# Patient Record
Sex: FEMALE | Race: WHITE | URBAN - METROPOLITAN AREA
[De-identification: names, ages, dates, MRNs, and addresses within clinical notes are randomized per-mention and may not be internally consistent; named-entity substitution may affect disease eponyms.]

---

## 2017-01-09 ENCOUNTER — OFFICE VISIT (OUTPATIENT)
Dept: FAMILY MEDICINE | Facility: CLINIC | Age: 42
End: 2017-01-09
Payer: COMMERCIAL

## 2017-01-09 VITALS
TEMPERATURE: 97.9 F | HEIGHT: 68 IN | BODY MASS INDEX: 42.28 KG/M2 | HEART RATE: 70 BPM | DIASTOLIC BLOOD PRESSURE: 68 MMHG | WEIGHT: 279 LBS | SYSTOLIC BLOOD PRESSURE: 124 MMHG

## 2017-01-09 DIAGNOSIS — L98.9 SKIN LESION: Primary | ICD-10-CM

## 2017-01-09 PROCEDURE — 11100 HC BIOPSY SKIN/SUBQ/MUC MEM, SINGLE LESION: CPT | Performed by: PHYSICIAN ASSISTANT

## 2017-01-09 PROCEDURE — 88305 TISSUE EXAM BY PATHOLOGIST: CPT | Performed by: PHYSICIAN ASSISTANT

## 2017-01-09 PROCEDURE — 99202 OFFICE O/P NEW SF 15 MIN: CPT | Mod: 25 | Performed by: PHYSICIAN ASSISTANT

## 2017-01-09 NOTE — MR AVS SNAPSHOT
"              After Visit Summary   2017    Nighat Nicole    MRN: [id number]           Patient Information     Date Of Birth          1975        Visit Information        Provider Department      2017 9:00 AM Gladis Fields PA-C [facility] [place]         Follow-ups after your visit        Who to contact     Normal or non-critical lab and imaging results will be communicated to you by Sharingforcehart, letter or phone within 4 business days after the clinic has received the results. If you do not hear from us within 7 days, please contact the clinic through MyChart or phone. If you have a critical or abnormal lab result, we will notify you by phone as soon as possible.  Submit refill requests through Nebula or call your pharmacy and they will forward the refill request to us. Please allow 3 business days for your refill to be completed.          If you need to speak with a  for additional information , please call: [phone]             Additional Information About Your Visit        Nebula Information     Nebula lets you send messages to your doctor, view your test results, renew your prescriptions, schedule appointments and more. To sign up, go to [email or web address] . Click on \"Log in\" on the left side of the screen, which will take you to the Welcome page. Then click on \"Sign up Now\" on the right side of the page.     You will be asked to enter the access code listed below, as well as some personal information. Please follow the directions to create your username and password.     Your access code is: [email or web address]  Expires: 2017 10:10 AM     Your access code will  in 90 days. If you need help or a new code, please call your [place] clinic or [phone].        Care EveryWhere ID     This is your Care EveryWhere ID. This could be used by other organizations to access your [place] medical records  [id number]        Your Vitals Were     Pulse Temperature " "Height BMI (Body Mass Index)          70 97.9  F (36.6  C) (Tympanic) 5' 8\" (1.727 m) 42.43 kg/m2         Blood Pressure from Last 3 Encounters:   01/09/17 124/68    Weight from Last 3 Encounters:   01/09/17 279 lb (126.554 kg)              Today, you had the following     No orders found for display       Primary Care Provider    None Specified       No primary provider on file.        Thank you!     Thank you for choosing [facility]  for your care. Our goal is always to provide you with excellent care. Hearing back from our patients is one way we can continue to improve our services. Please take a few minutes to complete the written survey that you may receive in the mail after your visit with us. Thank you!             Your Updated Medication List - Protect others around you: Learn how to safely use, store and throw away your medicines at www.disposemymeds.org.      Notice  As of 1/9/2017 10:10 AM    You have not been prescribed any medications.      "

## 2017-01-09 NOTE — PROGRESS NOTES
"  SUBJECTIVE:                                                    Nighat Nicole is a 41 year old female who presents to clinic today for the following health issues:      Concern - Bump on Finger      Onset: over 1 month     Description:   Right hand, middle finger, it started out very small and now it has gotten bigger after she has been picking and scratching at it. Redness, no pain.     Intensity: moderate    Progression of Symptoms:  worsening    Accompanying Signs & Symptoms:  None       Previous history of similar problem:   None    Precipitating factors:   Worsened by: Scratching and picking at it     Alleviating factors:  Improved by: None       Therapies Tried and outcome: None    Bump first appeared on finger about 1 month ago  She admits that she started picking at it and it scabbed over and then she continued to pick at it  She finally started covering it with a bandaid and has now left the bandaid on all the time  Bump doesn't seem to be going away     Patient does do boxing as part of her work out - not sure if that is how the bump started or not      Problem list and histories reviewed & adjusted, as indicated.  Additional history: as documented    No current outpatient prescriptions on file.     Allergies   Allergen Reactions     Seasonal Allergies        ROS:  Remainder of ROS obtained and found to be negative other than that which was documented above      OBJECTIVE:                                                    /68 mmHg  Pulse 70  Temp(Src) 97.9  F (36.6  C) (Tympanic)  Ht 5' 8\" (1.727 m)  Wt 279 lb (126.554 kg)  BMI 42.43 kg/m2  Body mass index is 42.43 kg/(m^2).  GENERAL: healthy, alert and no distress  FINGER, 3rd digit, right hand:  Over MIP joint of 3rd digit there is a loosely attached lump of thickened skin with intact skin underneath. Skin around knuckle more friable given constant bandaid use.     Diagnostic Test Results:  none      ASSESSMENT/PLAN:                          "                           (L98.9) Skin lesion  (primary encounter diagnosis)  Comment: Unclear what lesion started as - possible calloused skin secondary to boxing that she picked at to the point that it became irritated. On exam today there is a chunk or nodule that is loosely attached to the skin underneath. Discussed removal of the lesion given the skin around it is otherwise healthy appearing and will likey heal better if she is not constantly needing a bandaid. Patient agreed to this. Will send removed tissue for biopsy given unclear initial diagnosis although I suspect much of what will be seen is inflammatory changes.   Plan: BIOPSY SKIN/SUBQ/MUC MEM, SINGLE LESION,         Surgical pathology exam            PROCEDURE: Removal of skin lesion - biopsy  PERFORMED BY: Gladis Fields PA-C  CONSENT: Discussed risks and benefits of procedure as well as expected outcome. Patient understood and agreed to proceed. Consent signed and will be scanned into the chart  DESCRIPTION: Area was cleaned and prepped. <1cc of 1% lidocaine without epinephrine was used to locally numb the area. Nodule was then grasped with forceps and using an 11 blade scalpel small area of attachment was severed. Nodule placed in formalin to be sent to pathology. There was some immediate bleeding which was stopped with firm pressure to the area. A small dab of drysol was then used over the area and it was again wrapped with pressure dressing. Patient advised to leave dressing in place for 24 hours after which she can change the dressing but would apply another pressure dressing for at least 1 more day after which she can dress as needed.  COMPLICATiONS: minor bleeding which resolved quickly with pressure. Otherwise tolerated well.   Follow up as needed        Gladis Fields PA-C  [facility]

## 2017-01-09 NOTE — NURSING NOTE
"Chief Complaint   Patient presents with     Finger       Initial /68 mmHg  Pulse 70  Temp(Src) 97.9  F (36.6  C) (Tympanic)  Ht 5' 8\" (1.727 m)  Wt 279 lb (126.554 kg)  BMI 42.43 kg/m2 Estimated body mass index is 42.43 kg/(m^2) as calculated from the following:    Height as of this encounter: 5' 8\" (1.727 m).    Weight as of this encounter: 279 lb (126.554 kg).  BP completed using cuff size: aaliyah Jackson CMA    "

## 2017-01-11 LAB — COPATH REPORT: NORMAL

## 2017-04-03 ENCOUNTER — OFFICE VISIT (OUTPATIENT)
Dept: FAMILY MEDICINE | Facility: CLINIC | Age: 42
End: 2017-04-03
Payer: COMMERCIAL

## 2017-04-03 VITALS
WEIGHT: 279.8 LBS | HEART RATE: 80 BPM | DIASTOLIC BLOOD PRESSURE: 94 MMHG | BODY MASS INDEX: 42.41 KG/M2 | HEIGHT: 68 IN | SYSTOLIC BLOOD PRESSURE: 139 MMHG | TEMPERATURE: 98.1 F | OXYGEN SATURATION: 100 %

## 2017-04-03 DIAGNOSIS — R07.0 THROAT PAIN: ICD-10-CM

## 2017-04-03 DIAGNOSIS — J98.01 ACUTE BRONCHOSPASM: Primary | ICD-10-CM

## 2017-04-03 LAB
DEPRECATED S PYO AG THROAT QL EIA: NORMAL
MICRO REPORT STATUS: NORMAL
SPECIMEN SOURCE: NORMAL

## 2017-04-03 PROCEDURE — 87880 STREP A ASSAY W/OPTIC: CPT | Performed by: PHYSICIAN ASSISTANT

## 2017-04-03 PROCEDURE — 99213 OFFICE O/P EST LOW 20 MIN: CPT | Performed by: PHYSICIAN ASSISTANT

## 2017-04-03 PROCEDURE — 87081 CULTURE SCREEN ONLY: CPT | Performed by: PHYSICIAN ASSISTANT

## 2017-04-03 RX ORDER — PREDNISONE 20 MG/1
40 TABLET ORAL DAILY
Qty: 10 TABLET | Refills: 0 | Status: SHIPPED | OUTPATIENT
Start: 2017-04-03 | End: 2017-04-08

## 2017-04-03 RX ORDER — ALBUTEROL SULFATE 90 UG/1
2 AEROSOL, METERED RESPIRATORY (INHALATION) EVERY 6 HOURS PRN
Qty: 1 INHALER | Refills: 0 | Status: SHIPPED | OUTPATIENT
Start: 2017-04-03

## 2017-04-03 NOTE — PATIENT INSTRUCTIONS
Take the prednisone 40mg (2 tabs) once daily (in the morning) for the next 5 days    Use the inhaler at least two times daily - can use up to every 4 hours if it helps    Let me know if symptoms worsen or fail to improve

## 2017-04-03 NOTE — LETTER
[facility]  [address] [address]  Phone: [phone]    April 6, 2017    Nighat Nicole  [address]  [place] [address]              Dear Ms. Nicole,      The results of your 24 hour throat culture was negative.  Please contact your clinic if you have any questions or concerns.            Sincerely,      [facility] Providers

## 2017-04-03 NOTE — MR AVS SNAPSHOT
"              After Visit Summary   4/3/2017    Nighat Nicole    MRN: [id number]           Patient Information     Date Of Birth          1975        Visit Information        Provider Department      4/3/2017 9:20 AM Gladis Fields PA-C [facility]        Today's Diagnoses     Throat pain    -  1    Acute bronchospasm          Care Instructions    Take the prednisone 40mg (2 tabs) once daily (in the morning) for the next 5 days    Use the inhaler at least two times daily - can use up to every 4 hours if it helps    Let me know if symptoms worsen or fail to improve        Follow-ups after your visit        Who to contact     Normal or non-critical lab and imaging results will be communicated to you by C8 MediSensorshart, letter or phone within 4 business days after the clinic has received the results. If you do not hear from us within 7 days, please contact the clinic through C8 MediSensorshart or phone. If you have a critical or abnormal lab result, we will notify you by phone as soon as possible.  Submit refill requests through Sentinel [employer] or call your pharmacy and they will forward the refill request to us. Please allow 3 business days for your refill to be completed.          If you need to speak with a  for additional information , please call: [phone]             Additional Information About Your Visit        Sentinel [employer] Information     [employer] lets you send messages to your doctor, view your test results, renew your prescriptions, schedule appointments and more. To sign up, go to [email or web address] . Click on \"Log in\" on the left side of the screen, which will take you to the Welcome page. Then click on \"Sign up Now\" on the right side of the page.     You will be asked to enter the access code listed below, as well as some personal information. Please follow the directions to create your username and password.     Your access code is: [email or web address]  Expires: 4/9/2017 11:10 AM     Your " "access code will  in 90 days. If you need help or a new code, please call your [place] clinic or [phone].        Care EveryWhere ID     This is your Care EveryWhere ID. This could be used by other organizations to access your [place] medical records  [id number]        Your Vitals Were     Pulse Temperature Height Pulse Oximetry BMI (Body Mass Index)       80 98.1  F (36.7  C) (Tympanic) 5' 8\" (1.727 m) 100% 42.54 kg/m2        Blood Pressure from Last 3 Encounters:   17 (!) 139/94   17 124/68    Weight from Last 3 Encounters:   17 279 lb 12.8 oz (126.9 kg)   17 279 lb (126.6 kg)              We Performed the Following     Beta strep group A culture     Strep, Rapid Screen          Today's Medication Changes          These changes are accurate as of: 4/3/17 10:11 AM.  If you have any questions, ask your nurse or doctor.               Start taking these medicines.        Dose/Directions    albuterol 108 (90 BASE) MCG/ACT Inhaler   Commonly known as:  PROAIR HFA/PROVENTIL HFA/VENTOLIN HFA   Used for:  Acute bronchospasm   Started by:  Gladis Fields PA-C        Dose:  2 puff   Inhale 2 puffs into the lungs every 6 hours as needed for shortness of breath / dyspnea or wheezing   Quantity:  1 Inhaler   Refills:  0       predniSONE 20 MG tablet   Commonly known as:  DELTASONE   Used for:  Acute bronchospasm   Started by:  Gladis Fields PA-C        Dose:  40 mg   Take 2 tablets (40 mg) by mouth daily for 5 days   Quantity:  10 tablet   Refills:  0            Where to get your medicines      These medications were sent to [facility] [place], [place] - [address]  [address] [place] [address]     Phone:  [phone]     albuterol 108 (90 BASE) MCG/ACT Inhaler    predniSONE 20 MG tablet                Primary Care Provider    None Specified       No primary provider on file.        Thank you!     Thank you for choosing [facility]  for your care. Our goal is always to provide you with excellent care. Hearing back from our patients is one way we can continue to improve our services. Please take a few minutes to complete the written survey that you may receive in the mail after your visit with us. Thank you!             Your Updated Medication List - Protect others around you: Learn how to safely use, store and throw away your medicines at www.disposemymeds.org.          This list is accurate as of: 4/3/17 10:11 AM.  Always use your most recent med list.                   Brand Name Dispense Instructions for use    albuterol 108 (90 BASE) MCG/ACT Inhaler    PROAIR HFA/PROVENTIL HFA/VENTOLIN HFA    1 Inhaler    Inhale 2 puffs into the lungs every 6 hours as needed for shortness of breath / dyspnea or wheezing       predniSONE 20 MG tablet    DELTASONE    10 tablet    Take 2 tablets (40 mg) by mouth daily for 5 days

## 2017-04-03 NOTE — LETTER
[facility]  [address] [facility] [address] [facility] [address] [address]  Phone: [phone]    April 3, 2017        Nighat Nicole  [address]  [place] [address]          To whom it may concern:    RE: Nighat Nicole    Patient was seen and treated today at our clinic and missed work.     Please contact me for questions or concerns.      Sincerely,        Gladis Fields PA-C

## 2017-04-03 NOTE — PROGRESS NOTES
SUBJECTIVE:                                                    Nighat Nicole is a 41 year old female who presents to clinic today for the following health issues:      ENT Symptoms             Symptoms: cc Present Absent Comment   Fever/Chills  x  Maybe when is started last week   Fatigue  x     Muscle Aches  x  Last week   Eye Irritation  x     Sneezing  x  Over the weekend   Nasal Hood/Drg x x     Sinus Pressure/Pain  x     Loss of smell   x    Dental pain   x    Sore Throat  x     Swollen Glands  x     Ear Pain/Fullness   x    Cough x x     Wheeze  x     Chest Pain  x  Pain from coughing   Shortness of breath  x  At night   Rash   x    Other  x  headache from the sinus pressure     Symptom duration:  Almost a week. Start last Tuesday evening   Symptom severity:  moderate   Treatments tried:  night time stuff with acetaminophen, and ibuprofen. Last dose of ibuprofen ws yesterday afternoon   Contacts:  was sick       Started with body aches and chills  Now it's the cough, feeling tight in he her chest  No h/o asthma - did have an inhaler when she was a kid        Problem list and histories reviewed & adjusted, as indicated.  Additional history: as documented    Current Outpatient Prescriptions   Medication Sig Dispense Refill     predniSONE (DELTASONE) 20 MG tablet Take 2 tablets (40 mg) by mouth daily for 5 days 10 tablet 0     albuterol (PROAIR HFA/PROVENTIL HFA/VENTOLIN HFA) 108 (90 BASE) MCG/ACT Inhaler Inhale 2 puffs into the lungs every 6 hours as needed for shortness of breath / dyspnea or wheezing 1 Inhaler 0     Allergies   Allergen Reactions     Seasonal Allergies        Reviewed and updated as needed this visit by clinical staff       Reviewed and updated as needed this visit by Provider         ROS:  Remainder of ROS obtained and found to be negative other than that which was documented above      OBJECTIVE:                                                    BP (!) 139/94 (BP Location: Right  "arm, Patient Position: Chair, Cuff Size: Adult Large)  Pulse 80  Temp 98.1  F (36.7  C) (Tympanic)  Ht 5' 8\" (1.727 m)  Wt 279 lb 12.8 oz (126.9 kg)  SpO2 100%  BMI 42.54 kg/m2  Body mass index is 42.54 kg/(m^2).  GENERAL: healthy, alert and no distress  EYES: Eyes grossly normal to inspection  HENT: ear canals and TM's normal, nose and mouth without ulcers or lesions  NECK: no adenopathy and no asymmetry, masses, or scars  RESP: lungs with breath sounds throughout, no crackles but prolonged expiratory phase with wheezing  CV: regular rates and rhythm, normal S1 S2, no S3 or S4, no murmur, click or rub and no peripheral edema    Diagnostic Test Results:  Rapid strep: negative     ASSESSMENT/PLAN:                                                    (J98.01) Acute bronchospasm  (primary encounter diagnosis)  Comment: rapid negative. Lung exam with wheezing throughout, no crackles. Will treat as bronchitis with bronchospasm. Will follow up with throat culture and advised patient to call or follow up if any of the symptoms worsen.   Reviewed side effects of prednisone - to call if any difficulty taking the medication  Plan: predniSONE (DELTASONE) 20 MG tablet, albuterol         (PROAIR HFA/PROVENTIL HFA/VENTOLIN HFA) 108 (90        BASE) MCG/ACT Inhaler            (R07.0) Throat pain  Comment:   Plan: Strep, Rapid Screen, Beta strep group A culture                    Gladis Fields PA-C  [facility]    "

## 2017-04-03 NOTE — NURSING NOTE
"Chief Complaint   Patient presents with     URI       Initial There were no vitals taken for this visit. Estimated body mass index is 42.42 kg/(m^2) as calculated from the following:    Height as of 1/9/17: 5' 8\" (1.727 m).    Weight as of 1/9/17: 279 lb (126.6 kg).  Medication Reconciliation: complete   Claudia Pepper CMA    "

## 2017-04-04 ENCOUNTER — TELEPHONE (OUTPATIENT)
Dept: FAMILY MEDICINE | Facility: CLINIC | Age: 42
End: 2017-04-04

## 2017-04-04 NOTE — TELEPHONE ENCOUNTER
Reason for Call:  Other call back    Detailed comments: She was seen yesterday for the flu.  She stayed home from work again today.  She is wondering if she can get a note for work for today.  She will pick the note up at the .  Please advise.    Phone Number Patient can be reached at: Home number on file [phone] (home)    Best Time: any    Can we leave a detailed message on this number? YES    Call taken on 4/4/2017 at 9:17 AM by Scarlett Thomas

## 2017-04-05 LAB
BACTERIA SPEC CULT: NORMAL
MICRO REPORT STATUS: NORMAL
SPECIMEN SOURCE: NORMAL

## 2017-04-06 NOTE — TELEPHONE ENCOUNTER
I was not in the office when she called initially.   Would she still like the letter for 4/4 or did she miss any additional days?   Is there a fax number we can send it to or would she rather pick it up?   Gladis

## 2017-04-07 NOTE — TELEPHONE ENCOUNTER
Patient was notified of strep culture results.    Patient does not need a note at this time.      Lenore MARLEY RN

## 2017-05-01 ENCOUNTER — OFFICE VISIT (OUTPATIENT)
Dept: FAMILY MEDICINE | Facility: CLINIC | Age: 42
End: 2017-05-01
Payer: COMMERCIAL

## 2017-05-01 VITALS
WEIGHT: 277 LBS | HEIGHT: 68 IN | TEMPERATURE: 98 F | SYSTOLIC BLOOD PRESSURE: 132 MMHG | BODY MASS INDEX: 41.98 KG/M2 | DIASTOLIC BLOOD PRESSURE: 88 MMHG | HEART RATE: 83 BPM

## 2017-05-01 DIAGNOSIS — H10.33 ACUTE BACTERIAL CONJUNCTIVITIS OF BOTH EYES: Primary | ICD-10-CM

## 2017-05-01 DIAGNOSIS — J06.9 UPPER RESPIRATORY TRACT INFECTION, UNSPECIFIED TYPE: ICD-10-CM

## 2017-05-01 PROCEDURE — 99213 OFFICE O/P EST LOW 20 MIN: CPT | Performed by: PHYSICIAN ASSISTANT

## 2017-05-01 NOTE — MR AVS SNAPSHOT
"              After Visit Summary   2017    Nighat Nicole    MRN: [id number]           Patient Information     Date Of Birth          1975        Visit Information        Provider Department      2017 10:00 AM Gladis Fields PA-C [facility] [place]         Follow-ups after your visit        Who to contact     Normal or non-critical lab and imaging results will be communicated to you by MyChart, letter or phone within 4 business days after the clinic has received the results. If you do not hear from us within 7 days, please contact the clinic through MyChart or phone. If you have a critical or abnormal lab result, we will notify you by phone as soon as possible.  Submit refill requests through [employer] Noodle or call your pharmacy and they will forward the refill request to us. Please allow 3 business days for your refill to be completed.          If you need to speak with a  for additional information , please call: [phone]             Additional Information About Your Visit        Insurance Noodle Information     Ins[employer] Noodle lets you send messages to your doctor, view your test results, renew your prescriptions, schedule appointments and more. To sign up, go to [email or web address] . Click on \"Log in\" on the left side of the screen, which will take you to the Welcome page. Then click on \"Sign up Now\" on the right side of the page.     You will be asked to enter the access code listed below, as well as some personal information. Please follow the directions to create your username and password.     Your access code is: [email or web address]  Expires: 2017 10:31 AM     Your access code will  in 90 days. If you need help or a new code, please call your [place] clinic or [phone].        Care EveryWhere ID     This is your Care EveryWhere ID. This could be used by other organizations to access your [place] medical records  [id number]        Your Vitals Were     Pulse " "Temperature Height BMI (Body Mass Index)          83 98  F (36.7  C) (Tympanic) 5' 8\" (1.727 m) 42.12 kg/m2         Blood Pressure from Last 3 Encounters:   05/01/17 132/88   04/03/17 (!) 139/94   01/09/17 124/68    Weight from Last 3 Encounters:   05/01/17 277 lb (125.6 kg)   04/03/17 279 lb 12.8 oz (126.9 kg)   01/09/17 279 lb (126.6 kg)              Today, you had the following     No orders found for display       Primary Care Provider    None Specified       No primary provider on file.        Thank you!     Thank you for choosing [facility]  for your care. Our goal is always to provide you with excellent care. Hearing back from our patients is one way we can continue to improve our services. Please take a few minutes to complete the written survey that you may receive in the mail after your visit with us. Thank you!             Your Updated Medication List - Protect others around you: Learn how to safely use, store and throw away your medicines at www.disposemymeds.org.          This list is accurate as of: 5/1/17 10:31 AM.  Always use your most recent med list.                   Brand Name Dispense Instructions for use    albuterol 108 (90 BASE) MCG/ACT Inhaler    PROAIR HFA/PROVENTIL HFA/VENTOLIN HFA    1 Inhaler    Inhale 2 puffs into the lungs every 6 hours as needed for shortness of breath / dyspnea or wheezing         "

## 2017-05-01 NOTE — PROGRESS NOTES
SUBJECTIVE:                                                    Nighat Nicole is a 41 year old female who presents to clinic today for the following health issues:      Eye(s) Problem     Onset: Friday 4/28     Description:   Location: bilateral, started with the right eye   Pain: YES- aching and sore   Redness: YES    Accompanying Signs & Symptoms:  Discharge/mattering: YES  Swelling: YES  Visual changes: no  Fever: no  Nasal Congestion: YES  Bothered by bright lights: no     History:   Trauma: no   Foreign body exposure: no    Precipitating factors:   Wearing contacts: no    Alleviating factors:  Improved by: None       Therapies Tried and outcome: Tried eye drops she got from urgent care, ibuprofen, decongestant       Was seen in [facility], on Friday 4/28. Was given eye drops.     Cold symptoms for a few weeks  Does feel like they are/were better until Friday  Noticed some more sinus pressure and her eyes started to look red  Seemed to get worse during the day so went to urgent care - they weren't convinced it was pink eye but gave her drops and told her to use them if the eyes worsened.   Says by that night she had more discharge and redness so started the drops  Continues to have drainage and redness. No pain with eye movements vision intact  No cough - overall better  Still some congestion but not sure if she just isn't feeling better because of her eyes     recently treated for sinus infection and pink eye      Problem list and histories reviewed & adjusted, as indicated.  Additional history: as documented    Current Outpatient Prescriptions   Medication Sig Dispense Refill     albuterol (PROAIR HFA/PROVENTIL HFA/VENTOLIN HFA) 108 (90 BASE) MCG/ACT Inhaler Inhale 2 puffs into the lungs every 6 hours as needed for shortness of breath / dyspnea or wheezing (Patient not taking: Reported on 5/1/2017) 1 Inhaler 0     Allergies   Allergen Reactions     Seasonal Allergies   "      Reviewed and updated as needed this visit by clinical staff       Reviewed and updated as needed this visit by Provider         ROS:  Remainder of ROS obtained and found to be negative other than that which was documented above      OBJECTIVE:                                                    /88  Pulse 83  Temp 98  F (36.7  C) (Tympanic)  Ht 5' 8\" (1.727 m)  Wt 277 lb (125.6 kg)  BMI 42.12 kg/m2  Body mass index is 42.12 kg/(m^2).  GENERAL: healthy, alert and no distress  EYES: conjunctival inflammation, redness. PERRL. EOMI without pain  HENT: ear canals and TM's normal, nose and mouth without ulcers or lesions  RESP: lungs clear to auscultation - no rales, rhonchi or wheezes  CV: regular rates and rhythm, normal S1 S2, no S3 or S4 and no murmur, click or rub    Diagnostic Test Results:  none      ASSESSMENT/PLAN:                                                    (H10.33) Acute bacterial conjunctivitis of both eyes  (primary encounter diagnosis)  Comment: on polytrim x2 days of therapy  Plan: Continue with the polytrim. Follow up if not improving    (J06.9) Upper respiratory tract infection, unspecified type  Comment: overall symptoms have improved although still present slightly  Plan: continue eye treatment as above and symptomatic management. Follow up in the next 2-3 days if no change or any worsening.         Gladis Fields PA-C  [facility]    "

## 2017-05-01 NOTE — NURSING NOTE
"Chief Complaint   Patient presents with     Eye Problem       Initial BP (!) 140/98 (BP Location: Right arm, Patient Position: Chair, Cuff Size: Adult Large)  Pulse 83  Temp 98  F (36.7  C) (Tympanic)  Ht 5' 8\" (1.727 m)  Wt 277 lb (125.6 kg)  BMI 42.12 kg/m2 Estimated body mass index is 42.12 kg/(m^2) as calculated from the following:    Height as of this encounter: 5' 8\" (1.727 m).    Weight as of this encounter: 277 lb (125.6 kg).  Medication Reconciliation: complete     Jong Jackson CMA    "

## 2017-05-01 NOTE — LETTER
[facility]  [id number] [place] [id number]  Phone: [phone]    May 1, 2017        Nighat Nicole  [address]  [facility] [address]          To whom it may concern:    RE: Nighat Nicole    Patient was seen and treated today at our clinic and missed work 5/1/17 and 5/2/17    Please contact me for questions or concerns.      Sincerely,        Gladis Fields PA-C

## 2017-05-03 ENCOUNTER — TELEPHONE (OUTPATIENT)
Dept: FAMILY MEDICINE | Facility: CLINIC | Age: 42
End: 2017-05-03

## 2017-05-03 DIAGNOSIS — J01.90 ACUTE SINUSITIS WITH SYMPTOMS > 10 DAYS: Primary | ICD-10-CM

## 2017-05-03 NOTE — TELEPHONE ENCOUNTER
Reason for call:  Patient reporting a symptom    Symptom or request: pink eye    Duration (how long have symptoms been present): since 5/1/17    Have you been treated for this before? No    Additional comments: Nighat MIRANDA MESSAGE:  States she was seen by Gladis on 5/1/17 and asked to call back by middle of week if her eye was not better.  It's still red and she also has a sore throat.  Please call and assess.  Thank you..Massiel Lancaster    Phone Number patient can be reached at:  Home number on file [phone] (home)    Best Time:  Did not state in message    Can we leave a detailed message on this number:  did not state in letter    Call taken on 5/3/2017 at 9:01 AM by Massiel Lancaster

## 2017-05-03 NOTE — TELEPHONE ENCOUNTER
Message left on answering machine to call the [facility] RN back.  Hermelindo Montoya RN

## 2017-05-04 NOTE — TELEPHONE ENCOUNTER
Please call patient and let her know I sent in a script for an oral antibiotic to the [place] pharmacy  She has had upper respiratory symptoms consistent with a possible sinus infection now for over 2 weeks. Given lack of improvement with over the counter treatments, will try an oral antibiotic.   She can continue the eye drops although it is very likely the eye is more viral in origin in which case there are no drops that are going to make it go away any quicker and it just needs time.   Hopefully all symptoms improve with the oral medications  She is able to return to work     Gladis

## 2017-05-05 ENCOUNTER — TELEPHONE (OUTPATIENT)
Dept: FAMILY MEDICINE | Facility: CLINIC | Age: 42
End: 2017-05-05

## 2017-05-05 NOTE — TELEPHONE ENCOUNTER
Panel Management Review      Patient has the following on her problem list: None      Composite cancer screening  Chart review shows that this patient is due/due soon for the following Pap Smear  Summary:    Patient is due/failing the following:   PAP and PHYSICAL    Action needed:   Patient needs office visit for a physical and pap.    Type of outreach:    Phone, left message for patient to call back.     Questions for provider review:    None                                                                                                                                    Jong Jackson CMA       Chart routed to self.

## 2017-06-06 ENCOUNTER — OFFICE VISIT (OUTPATIENT)
Dept: FAMILY MEDICINE | Facility: CLINIC | Age: 42
End: 2017-06-06
Payer: COMMERCIAL

## 2017-06-06 VITALS
HEART RATE: 76 BPM | TEMPERATURE: 97.9 F | DIASTOLIC BLOOD PRESSURE: 78 MMHG | SYSTOLIC BLOOD PRESSURE: 130 MMHG | WEIGHT: 278 LBS | BODY MASS INDEX: 42.13 KG/M2 | HEIGHT: 68 IN

## 2017-06-06 DIAGNOSIS — N89.8 VAGINAL DISCHARGE: ICD-10-CM

## 2017-06-06 DIAGNOSIS — R10.2 PELVIC PRESSURE IN FEMALE: Primary | ICD-10-CM

## 2017-06-06 LAB
ALBUMIN UR-MCNC: NEGATIVE MG/DL
APPEARANCE UR: CLEAR
BILIRUB UR QL STRIP: NEGATIVE
COLOR UR AUTO: YELLOW
GLUCOSE UR STRIP-MCNC: NEGATIVE MG/DL
HGB UR QL STRIP: ABNORMAL
KETONES UR STRIP-MCNC: NEGATIVE MG/DL
LEUKOCYTE ESTERASE UR QL STRIP: NEGATIVE
MICRO REPORT STATUS: NORMAL
NITRATE UR QL: NEGATIVE
PH UR STRIP: 5.5 PH (ref 5–7)
RBC #/AREA URNS AUTO: NORMAL /HPF (ref 0–2)
SP GR UR STRIP: 1.02 (ref 1–1.03)
SPECIMEN SOURCE: NORMAL
URN SPEC COLLECT METH UR: ABNORMAL
UROBILINOGEN UR STRIP-ACNC: 0.2 EU/DL (ref 0.2–1)
WBC #/AREA URNS AUTO: NORMAL /HPF (ref 0–2)
WET PREP SPEC: NORMAL

## 2017-06-06 PROCEDURE — 99213 OFFICE O/P EST LOW 20 MIN: CPT | Performed by: PHYSICIAN ASSISTANT

## 2017-06-06 PROCEDURE — 87210 SMEAR WET MOUNT SALINE/INK: CPT | Performed by: PHYSICIAN ASSISTANT

## 2017-06-06 PROCEDURE — 81001 URINALYSIS AUTO W/SCOPE: CPT | Performed by: PHYSICIAN ASSISTANT

## 2017-06-06 NOTE — MR AVS SNAPSHOT
"              After Visit Summary   2017    Nighat Nicole    MRN: [id number]           Patient Information     Date Of Birth          1975        Visit Information        Provider Department      2017 9:20 AM Gladis Fields PA-C [facility]        Today's Diagnoses     Pelvic pressure in female    -  1    Vaginal discharge           Follow-ups after your visit        Who to contact     Normal or non-critical lab and imaging results will be communicated to you by Dizko Samuraihart, letter or phone within 4 business days after the clinic has received the results. If you do not hear from us within 7 days, please contact the clinic through Dizko Samuraihart or phone. If you have a critical or abnormal lab result, we will notify you by phone as soon as possible.  Submit refill requests through Habitissimo or call your pharmacy and they will forward the refill request to us. Please allow 3 business days for your refill to be completed.          If you need to speak with a  for additional information , please call: [phone]             Additional Information About Your Visit        Habitissimo Information     Habitissimo lets you send messages to your doctor, view your test results, renew your prescriptions, schedule appointments and more. To sign up, go to [email or web address] . Click on \"Log in\" on the left side of the screen, which will take you to the Welcome page. Then click on \"Sign up Now\" on the right side of the page.     You will be asked to enter the access code listed below, as well as some personal information. Please follow the directions to create your username and password.     Your access code is: [email or web address]  Expires: 2017 10:31 AM     Your access code will  in 90 days. If you need help or a new code, please call your [facility] or [phone].        Care EveryWhere ID     This is your Care EveryWhere ID. This could be used by other organizations to access " "your [facility] medical records  [id number]        Your Vitals Were     Pulse Temperature Height BMI (Body Mass Index)          76 97.9  F (36.6  C) (Tympanic) 5' 8\" (1.727 m) 42.27 kg/m2         Blood Pressure from Last 3 Encounters:   06/06/17 130/78   05/01/17 132/88   04/03/17 (!) 139/94    Weight from Last 3 Encounters:   06/06/17 278 lb (126.1 kg)   05/01/17 277 lb (125.6 kg)   04/03/17 279 lb 12.8 oz (126.9 kg)              We Performed the Following     *UA reflex to Microscopic and Culture ([place] and [facility] (except [place] and [place])     Urine Microscopic     Wet prep        Primary Care Provider    None Specified       No primary provider on file.        Thank you!     Thank you for choosing [facility]  for your care. Our goal is always to provide you with excellent care. Hearing back from our patients is one way we can continue to improve our services. Please take a few minutes to complete the written survey that you may receive in the mail after your visit with us. Thank you!             Your Updated Medication List - Protect others around you: Learn how to safely use, store and throw away your medicines at www.disposemymeds.org.          This list is accurate as of: 6/6/17 10:15 AM.  Always use your most recent med list.                   Brand Name Dispense Instructions for use    albuterol 108 (90 BASE) MCG/ACT Inhaler    PROAIR HFA/PROVENTIL HFA/VENTOLIN HFA    1 Inhaler    Inhale 2 puffs into the lungs every 6 hours as needed for shortness of breath / dyspnea or wheezing         "

## 2017-06-06 NOTE — PROGRESS NOTES
SUBJECTIVE:                                                    Nighat Nicole is a 41 year old female who presents to clinic today for the following health issues:      URINARY TRACT SYMPTOMS     Onset: Started over the weekend     Description:   Painful urination (Dysuria): YES, pain and pressure   Blood in urine (Hematuria): YES- when she wipes there in pink on the toilet paper   Delay in urine (Hesitency): YES- frequency also     Intensity: moderate    Progression of Symptoms:  worsening    Accompanying Signs & Symptoms:  Fever/chills: no   Flank pain YES- she noticed lower back pain on Friday night, not as much anymore   Nausea and vomiting: no   Any vaginal symptoms: vaginal itching  Abdominal/Pelvic Pain: YES- when using the bathroom    History:   History of frequent UTI's: no   History of kidney stones: YES- she had kidney stones 11 years ago   Sexually Active: YES  Possibility of pregnancy: No    Precipitating factors:   None         Therapies Tried and outcome: Cranberry pills, drinking lemon water, Ibuprofen, Tylenol     Started on Friday  Symptoms still noticeable with urination - lots of pressure with urinating. Some burning and discomfort  Back pain improved  No fevers  Some itching in the area  Some increased discharge    End of last period was Friday  Regular per patient        Problem list and histories reviewed & adjusted, as indicated.  Additional history: as documented    Current Outpatient Prescriptions   Medication Sig Dispense Refill     albuterol (PROAIR HFA/PROVENTIL HFA/VENTOLIN HFA) 108 (90 BASE) MCG/ACT Inhaler Inhale 2 puffs into the lungs every 6 hours as needed for shortness of breath / dyspnea or wheezing (Patient not taking: Reported on 5/1/2017) 1 Inhaler 0     Allergies   Allergen Reactions     Seasonal Allergies        Reviewed and updated as needed this visit by clinical staff       Reviewed and updated as needed this visit by Provider         ROS:  Remainder of ROS obtained and  "found to be negative other than that which was documented above      OBJECTIVE:                                                    /78 (BP Location: Right arm, Patient Position: Chair, Cuff Size: Adult Large)  Pulse 76  Temp 97.9  F (36.6  C) (Tympanic)  Ht 5' 8\" (1.727 m)  Wt 278 lb (126.1 kg)  BMI 42.27 kg/m2  Body mass index is 42.27 kg/(m^2).  GENERAL: healthy, alert and no distress  RESP: lungs clear to auscultation - no rales, rhonchi or wheezes  CV: regular rates and rhythm, normal S1 S2, no S3 or S4 and no murmur, click or rub  ABDOMEN: soft, nontender and bowel sounds normal    Diagnostic Test Results:  Urinalysis -   UA RESULTS:  Recent Labs   Lab Test  06/06/17   0929   COLOR  Yellow   APPEARANCE  Clear   URINEGLC  Negative   URINEBILI  Negative   URINEKETONE  Negative   SG  1.025   UBLD  Moderate*   URINEPH  5.5   PROTEIN  Negative   UROBILINOGEN  0.2   NITRITE  Negative   LEUKEST  Negative   RBCU  O - 2  Urine was tested unconcentrated because <10 ml was received.     WBCU  O - 2  Urine was tested unconcentrated because <10 ml was received.            Wet prep: negative    ASSESSMENT/PLAN:                                                        ICD-10-CM    1. Pelvic pressure in female N94.89 *UA reflex to Microscopic and Culture ([place] and [facility] (except [place] and [place])     Urine Microscopic     Wet prep   2. Vaginal discharge N89.8 *UA reflex to Microscopic and Culture ([place] and [facility] (except [place] and [place])     Urine Microscopic       UA and wet prep negative.   Patient in no acute distress and symptoms slightly improved  Work on drinking plenty of fluids today to \"flush\" out system  If anything worsens, return right away  If no worse but still with lingering symptoms on Thursday, return for lab visit for repeat UA and self wet prep    Gladis Fields PA-C  [facility] [place]    "

## 2017-06-06 NOTE — NURSING NOTE
"Chief Complaint   Patient presents with     Kidney Problem       Initial /78 (BP Location: Right arm, Patient Position: Chair, Cuff Size: Adult Large)  Pulse 76  Temp 97.9  F (36.6  C) (Tympanic)  Ht 5' 8\" (1.727 m)  Wt 278 lb (126.1 kg)  BMI 42.27 kg/m2 Estimated body mass index is 42.27 kg/(m^2) as calculated from the following:    Height as of this encounter: 5' 8\" (1.727 m).    Weight as of this encounter: 278 lb (126.1 kg).  Medication Reconciliation: complete     Jong Jackson CMA    "

## 2017-06-08 ENCOUNTER — OFFICE VISIT (OUTPATIENT)
Dept: FAMILY MEDICINE | Facility: CLINIC | Age: 42
End: 2017-06-08
Payer: COMMERCIAL

## 2017-06-08 VITALS
TEMPERATURE: 98.2 F | WEIGHT: 280 LBS | HEIGHT: 68 IN | BODY MASS INDEX: 42.44 KG/M2 | SYSTOLIC BLOOD PRESSURE: 128 MMHG | DIASTOLIC BLOOD PRESSURE: 72 MMHG | HEART RATE: 74 BPM

## 2017-06-08 DIAGNOSIS — R10.2 PELVIC PRESSURE IN FEMALE: ICD-10-CM

## 2017-06-08 DIAGNOSIS — R30.0 BURNING WITH URINATION: ICD-10-CM

## 2017-06-08 DIAGNOSIS — B37.31 CANDIDIASIS OF VAGINA: ICD-10-CM

## 2017-06-08 DIAGNOSIS — Z12.4 ENCOUNTER FOR SCREENING FOR CERVICAL CANCER: Primary | ICD-10-CM

## 2017-06-08 LAB
ALBUMIN UR-MCNC: NEGATIVE MG/DL
APPEARANCE UR: CLEAR
BILIRUB UR QL STRIP: NEGATIVE
COLOR UR AUTO: YELLOW
GLUCOSE UR STRIP-MCNC: NEGATIVE MG/DL
HGB UR QL STRIP: NEGATIVE
KETONES UR STRIP-MCNC: NEGATIVE MG/DL
LEUKOCYTE ESTERASE UR QL STRIP: NEGATIVE
MICRO REPORT STATUS: ABNORMAL
NITRATE UR QL: NEGATIVE
PH UR STRIP: 5 PH (ref 5–7)
SP GR UR STRIP: 1.02 (ref 1–1.03)
SPECIMEN SOURCE: ABNORMAL
URN SPEC COLLECT METH UR: NORMAL
UROBILINOGEN UR STRIP-ACNC: 0.2 EU/DL (ref 0.2–1)
WET PREP SPEC: ABNORMAL

## 2017-06-08 PROCEDURE — 87624 HPV HI-RISK TYP POOLED RSLT: CPT | Performed by: PHYSICIAN ASSISTANT

## 2017-06-08 PROCEDURE — 81003 URINALYSIS AUTO W/O SCOPE: CPT | Performed by: PHYSICIAN ASSISTANT

## 2017-06-08 PROCEDURE — 99213 OFFICE O/P EST LOW 20 MIN: CPT | Performed by: PHYSICIAN ASSISTANT

## 2017-06-08 PROCEDURE — 87210 SMEAR WET MOUNT SALINE/INK: CPT | Performed by: PHYSICIAN ASSISTANT

## 2017-06-08 PROCEDURE — G0145 SCR C/V CYTO,THINLAYER,RESCR: HCPCS | Performed by: PHYSICIAN ASSISTANT

## 2017-06-08 RX ORDER — FLUCONAZOLE 150 MG/1
150 TABLET ORAL ONCE
Qty: 1 TABLET | Refills: 0 | Status: SHIPPED | OUTPATIENT
Start: 2017-06-08 | End: 2017-06-08

## 2017-06-08 NOTE — LETTER
June 16, 2017    Nighat Nicole  [address]  [place] [address]    Dear Nighat,  We are happy to inform you that your PAP smear result from 6/8/17 is normal.  We are now able to do a follow up test on PAP smears. The DNA test is for HPV (Human Papilloma Virus). Cervical cancer is closely linked with certain types of HPV. Your result showed no evidence of high risk HPV.  Therefore we recommend you return in 3 years for your next pap smear and HPV test.  You will still need to return to the clinic every year for an annual exam and other preventive tests.  Please contact the clinic at [phone] with any questions.  Sincerely,    Gladis Fields PA-C/iza

## 2017-06-08 NOTE — PATIENT INSTRUCTIONS
Wet prep did show yeast - this could be causing the burning and irritation    Take the one dose of diflucan    Skin was a little red and irritated - the diflucan may help with this but you could also try vagisil or coconut oil to the outer skin surface as this can be soothing to irritated skin    Let me know if you continue to experience any symptoms

## 2017-06-08 NOTE — MR AVS SNAPSHOT
"              After Visit Summary   6/8/2017    Nighat Nicole    MRN: [id number]           Patient Information     Date Of Birth          1975        Visit Information        Provider Department      6/8/2017 9:20 AM Gladis Fields PA-C [facility]        Today's Diagnoses     Encounter for screening for cervical cancer     -  1    Burning with urination        Pelvic pressure in female        Candidiasis of vagina          Care Instructions      Wet prep did show yeast - this could be causing the burning and irritation    Take the one dose of diflucan    Skin was a little red and irritated - the diflucan may help with this but you could also try vagisil or coconut oil to the outer skin surface as this can be soothing to irritated skin    Let me know if you continue to experience any symptoms          Follow-ups after your visit        Who to contact     Normal or non-critical lab and imaging results will be communicated to you by fuseSPORThart, letter or phone within 4 business days after the clinic has received the results. If you do not hear from us within 7 days, please contact the clinic through fuseSPORThart or phone. If you have a critical or abnormal lab result, we will notify you by phone as soon as possible.  Submit refill requests through iCrossing or call your pharmacy and they will forward the refill request to us. Please allow 3 business days for your refill to be completed.          If you need to speak with a  for additional information , please call: [phone]             Additional Information About Your Visit        iCrossing Information     iCrossing lets you send messages to your doctor, view your test results, renew your prescriptions, schedule appointments and more. To sign up, go to [email or web address] . Click on \"Log in\" on the left side of the screen, which will take you to the Welcome page. Then click on \"Sign up Now\" on the right side of the page. " "    You will be asked to enter the access code listed below, as well as some personal information. Please follow the directions to create your username and password.     Your access code is: [email or web address]  Expires: 2017 10:31 AM     Your access code will  in 90 days. If you need help or a new code, please call your [facility] clinic or [phone].        Care EveryWhere ID     This is your Care EveryWhere ID. This could be used by other organizations to access your [facility] medical records  [id number]        Your Vitals Were     Pulse Temperature Height BMI (Body Mass Index)          74 98.2  F (36.8  C) (Tympanic) 5' 8\" (1.727 m) 42.57 kg/m2         Blood Pressure from Last 3 Encounters:   17 128/72   17 130/78   17 132/88    Weight from Last 3 Encounters:   17 280 lb (127 kg)   17 278 lb (126.1 kg)   17 277 lb (125.6 kg)              We Performed the Following     *UA reflex to Microscopic and Culture ([place] and [facility] (except [place] and [place])     HPV High Risk Types DNA Cervical     Pap imaged thin layer screen with HPV - recommended age 30 - 65 years (select HPV order below)     Wet prep          Today's Medication Changes          These changes are accurate as of: 17 10:01 AM.  If you have any questions, ask your nurse or doctor.               Start taking these medicines.        Dose/Directions    fluconazole 150 MG tablet   Commonly known as:  DIFLUCAN   Used for:  Candidiasis of vagina   Started by:  Gladis Fields PA-C        Dose:  150 mg   Take 1 tablet (150 mg) by mouth once for 1 dose   Quantity:  1 tablet   Refills:  0            Where to get your medicines      These medications were sent to [facility] - [address]  [address] [place] [address]     Phone:  [phone]     fluconazole 150 MG tablet                Primary Care Provider    None Specified       No primary " provider on file.        Thank you!     Thank you for choosing [facility]  for your care. Our goal is always to provide you with excellent care. Hearing back from our patients is one way we can continue to improve our services. Please take a few minutes to complete the written survey that you may receive in the mail after your visit with us. Thank you!             Your Updated Medication List - Protect others around you: Learn how to safely use, store and throw away your medicines at www.disposemymeds.org.          This list is accurate as of: 6/8/17 10:01 AM.  Always use your most recent med list.                   Brand Name Dispense Instructions for use    albuterol 108 (90 BASE) MCG/ACT Inhaler    PROAIR HFA/PROVENTIL HFA/VENTOLIN HFA    1 Inhaler    Inhale 2 puffs into the lungs every 6 hours as needed for shortness of breath / dyspnea or wheezing       fluconazole 150 MG tablet    DIFLUCAN    1 tablet    Take 1 tablet (150 mg) by mouth once for 1 dose

## 2017-06-08 NOTE — NURSING NOTE
"Chief Complaint   Patient presents with     Follow Up from 6/6       Initial /72 (BP Location: Right arm, Patient Position: Chair, Cuff Size: Adult Large)  Pulse 74  Temp 98.2  F (36.8  C) (Tympanic)  Ht 5' 8\" (1.727 m)  Wt 280 lb (127 kg)  BMI 42.57 kg/m2 Estimated body mass index is 42.57 kg/(m^2) as calculated from the following:    Height as of this encounter: 5' 8\" (1.727 m).    Weight as of this encounter: 280 lb (127 kg).  Medication Reconciliation: complete     Jong Jackson CMA    "

## 2017-06-08 NOTE — PROGRESS NOTES
"  SUBJECTIVE:                                                    Nighat Nicole is a 41 year old female who presents to clinic today for the following health issues:      Patient is here today to follow up from her visit on 6/6. She felt a lot worse yesterday when she made the appointment. Today she is doing a little better but still wondering if something is there.    No back pain or flank pain  No fevers  No abdominal pain or discomfort  Will have some mild pelvic pressure when her bladder is full and while urinating  Burning and some discomfort at the end of urination  Concerned that she may have cancer  Has not had a PAP in 3+ years - previously with [facility] but says she had not had a PAP with them recently either    Problem list and histories reviewed & adjusted, as indicated.  Additional history: as documented    Current Outpatient Prescriptions   Medication Sig Dispense Refill     albuterol (PROAIR HFA/PROVENTIL HFA/VENTOLIN HFA) 108 (90 BASE) MCG/ACT Inhaler Inhale 2 puffs into the lungs every 6 hours as needed for shortness of breath / dyspnea or wheezing (Patient not taking: Reported on 5/1/2017) 1 Inhaler 0     Allergies   Allergen Reactions     Seasonal Allergies        Reviewed and updated as needed this visit by clinical staff       Reviewed and updated as needed this visit by Provider         ROS:  Remainder of ROS obtained and found to be negative other than that which was documented above      OBJECTIVE:                                                    /72 (BP Location: Right arm, Patient Position: Chair, Cuff Size: Adult Large)  Pulse 74  Temp 98.2  F (36.8  C) (Tympanic)  Ht 5' 8\" (1.727 m)  Wt 280 lb (127 kg)  BMI 42.57 kg/m2  Body mass index is 42.57 kg/(m^2).  GENERAL: healthy, alert and no distress  RESP: lungs clear to auscultation - no rales, rhonchi or wheezes  CV: regular rates and rhythm, normal S1 S2, no S3 or S4 and no murmur, click or rub  ABDOMEN: soft, nontender and bowel " sounds normal   (female): mildly erythematous and raw appearing female external genitalia, normal urethral meatus  and vaginal mucosa pink, moist, well rugated. Patient uncomfortable with PAP - tightened up. Cervix normal appearing     Diagnostic Test Results:  Wet prep: yeast present  UA RESULTS:  Recent Labs   Lab Test  06/08/17   0950  06/06/17   0929   COLOR  Yellow  Yellow   APPEARANCE  Clear  Clear   URINEGLC  Negative  Negative   URINEBILI  Negative  Negative   URINEKETONE  Negative  Negative   SG  1.020  1.025   UBLD  Negative  Moderate*   URINEPH  5.0  5.5   PROTEIN  Negative  Negative   UROBILINOGEN  0.2  0.2   NITRITE  Negative  Negative   LEUKEST  Negative  Negative   RBCU   --   O - 2  Urine was tested unconcentrated because <10 ml was received.     WBCU   --   O - 2  Urine was tested unconcentrated because <10 ml was received.            ASSESSMENT/PLAN:                                                      ASSESSMENT/PLAN:      ICD-10-CM    1. Encounter for screening for cervical cancer  Z12.4 Pap imaged thin layer screen with HPV - recommended age 30 - 65 years (select HPV order below)     HPV High Risk Types DNA Cervical   2. Burning with urination R30.0 Wet prep     *UA reflex to Microscopic and Culture ([place] and [facility] (except [place] and [place])   3. Pelvic pressure in female N94.89    4. Candidiasis of vagina B37.3 fluconazole (DIFLUCAN) 150 MG tablet       Patient Instructions     Wet prep did show yeast - this could be causing the burning and irritation    Take the one dose of diflucan    Skin was a little red and irritated - the diflucan may help with this but you could also try vagisil or coconut oil to the outer skin surface as this can be soothing to irritated skin    Let me know if you continue to experience any symptoms            Gladis Fields PA-C  [facility]

## 2017-06-12 LAB
COPATH REPORT: NORMAL
PAP: NORMAL

## 2017-06-14 LAB
FINAL DIAGNOSIS: NORMAL
HPV HR 12 DNA CVX QL NAA+PROBE: NEGATIVE
HPV16 DNA SPEC QL NAA+PROBE: NEGATIVE
HPV18 DNA SPEC QL NAA+PROBE: NEGATIVE
SPECIMEN DESCRIPTION: NORMAL

## 2017-08-21 ENCOUNTER — OFFICE VISIT (OUTPATIENT)
Dept: OBGYN | Facility: CLINIC | Age: 42
End: 2017-08-21
Payer: COMMERCIAL

## 2017-08-21 VITALS
HEART RATE: 88 BPM | BODY MASS INDEX: 43.35 KG/M2 | HEIGHT: 68 IN | SYSTOLIC BLOOD PRESSURE: 145 MMHG | WEIGHT: 286 LBS | DIASTOLIC BLOOD PRESSURE: 97 MMHG

## 2017-08-21 DIAGNOSIS — E66.01 MORBID OBESITY, UNSPECIFIED OBESITY TYPE (H): ICD-10-CM

## 2017-08-21 DIAGNOSIS — N97.9 FEMALE INFERTILITY: Primary | ICD-10-CM

## 2017-08-21 DIAGNOSIS — R03.0 ELEVATED BLOOD PRESSURE READING WITHOUT DIAGNOSIS OF HYPERTENSION: ICD-10-CM

## 2017-08-21 PROCEDURE — 99244 OFF/OP CNSLTJ NEW/EST MOD 40: CPT | Performed by: OBSTETRICS & GYNECOLOGY

## 2017-08-21 PROCEDURE — 36415 COLL VENOUS BLD VENIPUNCTURE: CPT | Performed by: OBSTETRICS & GYNECOLOGY

## 2017-08-21 PROCEDURE — 99000 SPECIMEN HANDLING OFFICE-LAB: CPT | Performed by: OBSTETRICS & GYNECOLOGY

## 2017-08-21 PROCEDURE — 83520 IMMUNOASSAY QUANT NOS NONAB: CPT | Mod: 90 | Performed by: OBSTETRICS & GYNECOLOGY

## 2017-08-21 NOTE — NURSING NOTE
"Chief Complaint   Patient presents with     Consult       Initial BP (!) 145/97 (BP Location: Left arm, Patient Position: Chair, Cuff Size: Adult Large)  Pulse 88  Ht 5' 8\" (1.727 m)  Wt 286 lb (129.7 kg)  LMP 08/07/2017  Breastfeeding? No  BMI 43.49 kg/m2 Estimated body mass index is 43.49 kg/(m^2) as calculated from the following:    Height as of this encounter: 5' 8\" (1.727 m).    Weight as of this encounter: 286 lb (129.7 kg).  Medication Reconciliation: complete   Jaquelin Thomas CMA      "

## 2017-08-21 NOTE — MR AVS SNAPSHOT
After Visit Summary   8/21/2017    Nighat Nicole    MRN: [id number]           Patient Information     Date Of Birth          1975        Visit Information        Provider Department      8/21/2017 3:00 PM Eva Guerrero MD [facility]        Today's Diagnoses     Female infertility    -  1    Morbid obesity, unspecified obesity type (H)        Elevated blood pressure reading without diagnosis of hypertension          Care Instructions    Blood test today ([facility] for ovarian reserve)  Ovulation predictor kits  Progesterone lab 7-10 days after ovulation (or on cycle day 21)  Semen analysis  Try to get to Body Mass Index of 40  Return in 3-6 months to discuss next steps and to interpret all results  Take prenatal vitamin          Follow-ups after your visit        Future tests that were ordered for you today     Open Future Orders        Priority Expected Expires Ordered    Progesterone Routine  8/21/2018 8/21/2017            Who to contact     If you have questions or need follow up information about today's clinic visit or your schedule please contact [facility] directly at [phone].  Normal or non-critical lab and imaging results will be communicated to you by [employer]art, letter or phone within 4 business days after the clinic has received the results. If you do not hear from us within 7 days, please contact the clinic through [employer] or phone. If you have a critical or abnormal lab result, we will notify you by phone as soon as possible.  Submit refill requests through [employer] or call your pharmacy and they will forward the refill request to us. Please allow 3 business days for your refill to be completed.          Additional Information About Your Visit        [employer]ar[employer]Vista Information     [employer] lets you send messages to your doctor, view your test results, renew your prescriptions, schedule appointments and more. To sign up, go to [email or web address] . Click on  "\"Log in\" on the left side of the screen, which will take you to the Welcome page. Then click on \"Sign up Now\" on the right side of the page.     You will be asked to enter the access code listed below, as well as some personal information. Please follow the directions to create your username and password.     Your access code is: [email or web address]  Expires: 2017  3:31 PM     Your access code will  in 90 days. If you need help or a new code, please call your [place] clinic or [phone].        Care EveryWhere ID     This is your Care EveryWhere ID. This could be used by other organizations to access your [place] medical records  [id number]        Your Vitals Were     Pulse Height Last Period Breastfeeding? BMI (Body Mass Index)       88 5' 8\" (1.727 m) 2017 No 43.49 kg/m2        Blood Pressure from Last 3 Encounters:   17 (!) 145/97   17 128/72   17 130/78    Weight from Last 3 Encounters:   17 286 lb (129.7 kg)   17 280 lb (127 kg)   17 278 lb (126.1 kg)              We Performed the Following     Anti-Mullerian hormone        Primary Care Provider    None Specified       No primary provider on file.        Equal Access to Services     [facility] : Hadii bryan lewiso Sokenisha, waaxda luqadaha, qaybta kaalmada adeegluis enriqueda, dominic mendoza . So [facility] [phone].    ATENCIÓN: Si habla español, tiene a santo disposición servicios gratuitos de asistencia lingüística. Llame al [phone].    We comply with applicable federal civil rights laws and [place] laws. We do not discriminate on the basis of race, color, national origin, age, disability sex, sexual orientation or gender identity.            Thank you!     Thank you for choosing [facility]  for your care. Our goal is always to provide you with excellent care. Hearing back from our patients is one way we can continue to improve our services. Please take a few minutes to " complete the written survey that you may receive in the mail after your visit with us. Thank you!             Your Updated Medication List - Protect others around you: Learn how to safely use, store and throw away your medicines at www.disposemymeds.org.          This list is accurate as of: 8/21/17  3:31 PM.  Always use your most recent med list.                   Brand Name Dispense Instructions for use Diagnosis    albuterol 108 (90 BASE) MCG/ACT Inhaler    PROAIR HFA/PROVENTIL HFA/VENTOLIN HFA    1 Inhaler    Inhale 2 puffs into the lungs every 6 hours as needed for shortness of breath / dyspnea or wheezing    Acute bronchospasm

## 2017-08-21 NOTE — PATIENT INSTRUCTIONS
Blood test today ([facility] for ovarian reserve)  Ovulation predictor kits  Progesterone lab 7-10 days after ovulation (or on cycle day 21)  Semen analysis  Try to get to Body Mass Index of 40  Return in 3-6 months to discuss next steps and to interpret all results  Take prenatal vitamin

## 2017-08-23 NOTE — PROGRESS NOTES
OB/Gyn Consultation:    Gladis Fields, ESTHER  [address] [place] [address]    Nighat Nicole was sent to me for consultation by Gladis Fields for evaluation of infertility.      SUBJECTIVE:                                                   CC:  Patient presents with:  Consult      HPI:  Nighat Nicole is a 41 year old  who presents for infertility counseling.  She has been  for 5 years, having unprotected sex for the last 2-3 years.  Has never had a positive pregnancy test.      Ovulation factor: Periods are pretty regular, every 28-30 days, very occasionally longer than that, about 35 days.  Bleeds for 3-4 days.  OPKs - has never done.  No galactorrhea, acne, acanthosis, spontaneous nipple discharge, hirsutism.  Occasionally has to pluck a hair on her chin.  Weight is stable, has never been diagnosed with diabetes or PCOS. Patient's last menstrual period was 2017.     Male factor:  is Reji Nicole  1978.  He has no prior children and has never done a SA.  He has depression/anxiety as well as suicidal and homidical ideation, has been hospitalized inpatient 5 times in the last couple of years.  He takes many different psych medications, although pt says he does not suffer from anorgasmia due to these medications.  No tobacco or ilicit drug use. no toxic exposures such as hot tubs, saunas.     Tubal factor: denies h/o PID, gc/chlam, prior ectopic, tubal surgery, endometriosis, ruptured appendix.  Never had HSG.  Uterine factor: denies h/o polyps, fibroids, known congenital anomalies.  No h/o abnormal pap smear.    Prenatal: h/o chicken pox, had all her vaccinations as a child, is on PNV    ROS: 10 point ROS negative other than as listed above in HPI.    Gyn History:  Patient's last menstrual period was 2017.       Recent pap smears:    Lab Results   Component Value Date    PAP NIL 2017       PMH, PSH, Soc Hx, Meds, and allergies reviewed in  "Epic.    OBJECTIVE:     BP (!) 145/97 (BP Location: Left arm, Patient Position: Chair, Cuff Size: Adult Large)  Pulse 88  Ht 5' 8\" (1.727 m)  Wt 286 lb (129.7 kg)  LMP 08/07/2017  Breastfeeding? No  BMI 43.49 kg/m2    Gen: Healthy appearing obese female, no acute distress, comfortable in large part, however tearful and anxious appearing when discussing her 's health problems  HENT: No scleral injection or icterus  CV: Regular rate  Resp: Normal work of breathing, no cough  GI: Abdomen soft, non-tender. No masses, organomegaly  Skin: No suspicious lesions or rashes, no hirsutism, no acne, no acanthosis  Psychiatric: mentation appears normal and affect bright/anxious  : Normal external female genitalia.  No external lesions, normal hair distribution.   SSE: Speculum exam reveals vaginal epithelium well rugated with normal physiologic discharge. Cervix appears smooth, pink, with no visible lesions.  Pt somewhat uncomfortable with exam, has   Bimanual exam reveals normal size uterus, non-tender, and mobile although exam limited by body habitus. No adnexal masses or tenderness. No cervical motion tenderness.       ASSESSMENT/PLAN:                                                      1. Female infertility  Extensive discussion of physiology of pregnancy and different factors for infertility.  She is relatively low risk of ovulation factor due to regular periods, however she is age 41 so ovarian reserve is a definite concern.  She elects against testing all hormones today because she just desires to start by confirming ovulation as her periods are fairly regular.  The couple is low to moderate risk male factor as he takes many medications although has never been worked up, low risk tubal factor, low risk uterine factor.  The work up for each of these was outlined in detail, including the possibilities to do step-wise testing starting with more likely factors, moving to the more invasive testing that is lower " yield and possibly not covered by insurance.  Discussed that infertility of unknown etiology can be treated here with clomid (or femara), clomid (or femara) +IUI, or referral to infertility specialist.  She does not wish to ever proceed as far as IVF, just wants to find out more about her chances to get pregnant.  She elects for semen analysis for her partner as well the outlined recommendations below.  The following tests will be ordered today and she will follow up as needed in 3-6 months.  - Progesterone; Future  - Anti-Mullerian hormone    2. Morbid obesity, unspecified obesity type (H)  Discussed weight loss to BMI of 40 in order to be eligible for ovulation induction either here or in an [facility] clinic.    3. Elevated blood pressure reading without diagnosis of hypertension  Quite anxious today, would not diagnose her with HTN with this one reading but would recommend weight loss (see #2), increased activity/exercise, and would recheck BP in 3-6 months at next visit.      Patient Instructions   Blood test today ([facility] for ovarian reserve)  Ovulation predictor kits  Progesterone lab 7-10 days after ovulation (or on cycle day 21)  Semen analysis  Try to get to Body Mass Index of 40  Return in 3-6 months to discuss next steps and to interpret all results  Take prenatal vitamin        Eva Guerrero MD, MPH  Obstetrics and Gynecology         CC: Gladis Fields PA-C  [address] [address]

## 2017-08-25 ENCOUNTER — RADIANT APPOINTMENT (OUTPATIENT)
Dept: GENERAL RADIOLOGY | Facility: CLINIC | Age: 42
End: 2017-08-25
Attending: PHYSICIAN ASSISTANT
Payer: COMMERCIAL

## 2017-08-25 ENCOUNTER — OFFICE VISIT (OUTPATIENT)
Dept: FAMILY MEDICINE | Facility: CLINIC | Age: 42
End: 2017-08-25
Payer: COMMERCIAL

## 2017-08-25 VITALS
WEIGHT: 283 LBS | SYSTOLIC BLOOD PRESSURE: 138 MMHG | HEART RATE: 80 BPM | DIASTOLIC BLOOD PRESSURE: 76 MMHG | TEMPERATURE: 98.2 F | BODY MASS INDEX: 42.89 KG/M2 | HEIGHT: 68 IN

## 2017-08-25 DIAGNOSIS — M79.641 PAIN OF RIGHT HAND: ICD-10-CM

## 2017-08-25 DIAGNOSIS — S62.309A: Primary | ICD-10-CM

## 2017-08-25 LAB — MIS SERPL-MCNC: 5.73 NG/ML (ref 0.18–11.71)

## 2017-08-25 PROCEDURE — 29125 APPL SHORT ARM SPLINT STATIC: CPT | Performed by: PHYSICIAN ASSISTANT

## 2017-08-25 PROCEDURE — 99213 OFFICE O/P EST LOW 20 MIN: CPT | Mod: 25 | Performed by: PHYSICIAN ASSISTANT

## 2017-08-25 PROCEDURE — 73130 X-RAY EXAM OF HAND: CPT | Mod: RT

## 2017-08-25 NOTE — PROGRESS NOTES
Please call with results and let her know they are normal    Good ovarian reserve!    Eva Guerrero MD, MPH

## 2017-08-25 NOTE — MR AVS SNAPSHOT
"              After Visit Summary   8/25/2017    Nighat Nicole    MRN: [id number]           Patient Information     Date Of Birth          1975        Visit Information        Provider Department      8/25/2017 11:20 AM Gladis Fields PA-C [facility]        Today's Diagnoses     Pain of right hand    -  1       Follow-ups after your visit        Who to contact     Normal or non-critical lab and imaging results will be communicated to you by [employer], letter or phone within 4 business days after the clinic has received the results. If you do not hear from us within 7 days, please contact the clinic through [employer] or phone. If you have a critical or abnormal lab result, we will notify you by phone as soon as possible.  Submit refill requests through [employer] or call your pharmacy and they will forward the refill request to us. Please allow 3 business days for your refill to be completed.          If you need to speak with a  for additional information , please call: [phone]             Additional Information About Your Visit        Visicon TechnologiesharHyperink Information     [employer] gives you secure access to your electronic health record. If you see a primary care provider, you can also send messages to your care team and make appointments. If you have questions, please call your primary care clinic.  If you do not have a primary care provider, please call [phone] and they will assist you.        Care EveryWhere ID     This is your Care EveryWhere ID. This could be used by other organizations to access your [facility] medical records  [id number]        Your Vitals Were     Pulse Temperature Height Last Period BMI (Body Mass Index)       80 98.2  F (36.8  C) (Tympanic) 5' 8\" (1.727 m) 08/07/2017 43.03 kg/m2        Blood Pressure from Last 3 Encounters:   08/25/17 138/76   08/21/17 (!) 145/97   06/08/17 128/72    Weight from Last 3 Encounters:   08/25/17 283 lb (128.4 kg)   08/21/17 " 286 lb (129.7 kg)   06/08/17 280 lb (127 kg)               Primary Care Provider    None Specified       No primary provider on file.        Equal Access to Services     YOVANA FRAUSTO : Hadii aad ku hadshashank Gregory, des chuckdhruv, michael power, dominic alexandramaylin perry. So [facility] [phone].    ATENCIÓN: Si habla español, tiene a santo disposición servicios gratuitos de asistencia lingüística. Llame al [phone].    We comply with applicable federal civil rights laws and [place] laws. We do not discriminate on the basis of race, color, national origin, age, disability sex, sexual orientation or gender identity.            Thank you!     Thank you for choosing [facility]  for your care. Our goal is always to provide you with excellent care. Hearing back from our patients is one way we can continue to improve our services. Please take a few minutes to complete the written survey that you may receive in the mail after your visit with us. Thank you!             Your Updated Medication List - Protect others around you: Learn how to safely use, store and throw away your medicines at www.disposemymeds.org.          This list is accurate as of: 8/25/17  2:11 PM.  Always use your most recent med list.                   Brand Name Dispense Instructions for use Diagnosis    albuterol 108 (90 BASE) MCG/ACT Inhaler    PROAIR HFA/PROVENTIL HFA/VENTOLIN HFA    1 Inhaler    Inhale 2 puffs into the lungs every 6 hours as needed for shortness of breath / dyspnea or wheezing    Acute bronchospasm
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
## 2017-08-29 ENCOUNTER — TELEPHONE (OUTPATIENT)
Dept: FAMILY MEDICINE | Facility: CLINIC | Age: 42
End: 2017-08-29

## 2017-08-29 NOTE — TELEPHONE ENCOUNTER
Reason for Call:  Other appointment    Detailed comments: Nighat called back and confirmed appointment for Wednesday with REED Saleh @ [place].  Thank you..Massiel Lancaster    Phone Number Patient can be reached at: Home number on file [phone] (home)    Best Time: any time    Can we leave a detailed message on this number? YES    Call taken on 8/29/2017 at 1:18 PM by Massiel Lancaster

## 2017-08-30 ENCOUNTER — OFFICE VISIT (OUTPATIENT)
Dept: ORTHOPEDICS | Facility: CLINIC | Age: 42
End: 2017-08-30
Payer: COMMERCIAL

## 2017-08-30 VITALS
SYSTOLIC BLOOD PRESSURE: 135 MMHG | BODY MASS INDEX: 42.89 KG/M2 | WEIGHT: 283 LBS | DIASTOLIC BLOOD PRESSURE: 70 MMHG | HEIGHT: 68 IN

## 2017-08-30 DIAGNOSIS — S62.392D CLOSED NONDISPLACED FRACTURE OF OTHER PART OF THIRD METACARPAL BONE OF RIGHT HAND WITH ROUTINE HEALING, SUBSEQUENT ENCOUNTER: Primary | ICD-10-CM

## 2017-08-30 PROCEDURE — 99207 ZZC NO BILLABLE SERVICE THIS VISIT: CPT | Performed by: FAMILY MEDICINE
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
## 2017-08-30 NOTE — NURSING NOTE
"Chief Complaint   Patient presents with     Fracture     right hand fracture > 10 days       Initial /70  Ht 5' 8\" (1.727 m)  Wt 283 lb (128.4 kg)  LMP 08/07/2017  BMI 43.03 kg/m2 Estimated body mass index is 43.03 kg/(m^2) as calculated from the following:    Height as of this encounter: 5' 8\" (1.727 m).    Weight as of this encounter: 283 lb (128.4 kg).  Medication Reconciliation: complete     Avel Burden ATC  "

## 2017-08-31 ENCOUNTER — THERAPY VISIT (OUTPATIENT)
Dept: OCCUPATIONAL THERAPY | Facility: CLINIC | Age: 42
End: 2017-08-31
Payer: COMMERCIAL

## 2017-08-31 DIAGNOSIS — S62.392D: ICD-10-CM

## 2017-08-31 DIAGNOSIS — M79.644 PAIN OF FINGER OF RIGHT HAND: Primary | ICD-10-CM

## 2017-08-31 DIAGNOSIS — M25.641 FINGER STIFFNESS, RIGHT: ICD-10-CM

## 2017-08-31 PROCEDURE — 97535 SELF CARE MNGMENT TRAINING: CPT | Mod: GO | Performed by: OCCUPATIONAL THERAPIST

## 2017-08-31 PROCEDURE — 29125 APPL SHORT ARM SPLINT STATIC: CPT | Mod: GO | Performed by: OCCUPATIONAL THERAPIST

## 2017-08-31 PROCEDURE — 97165 OT EVAL LOW COMPLEX 30 MIN: CPT | Mod: GO | Performed by: OCCUPATIONAL THERAPIST

## 2017-08-31 NOTE — MR AVS SNAPSHOT
After Visit Summary   8/31/2017    Nighat Nicole    MRN: [id number]           Patient Information     Date Of Birth          1975        Visit Information        Provider Department      8/31/2017 8:00 AM Candida French, [facility] [place]        Today's Diagnoses     Pain of finger of right hand    -  1    Finger stiffness, right        Oth fx third MC bone, right hand, subs for fx w routn heal           Follow-ups after your visit        Your next 10 appointments already scheduled     Sep 06, 2017  8:30 AM CDT   JENNIFER Hand with Aurelia Whittaker   [facility] [place] ([facility] [place] [facility])    [address]  [place] [id number] [phone]            Sep 08, 2017  9:00 AM CDT   Return Visit with Twin Baldwin,    [facility] [place] ([facility] [place])    [address]  [place] [id number] [phone]              Who to contact     If you have questions or need follow up information about today's clinic visit or your schedule please contact [facility] [place] directly at [phone].  Normal or non-critical lab and imaging results will be communicated to you by [employer], letter or phone within 4 business days after the clinic has received the results. If you do not hear from us within 7 days, please contact the clinic through [employer] or phone. If you have a critical or abnormal lab result, we will notify you by phone as soon as possible.  Submit refill requests through [employer] or call your pharmacy and they will forward the refill request to us. Please allow 3 business days for your refill to be completed.          Additional Information About Your Visit        A[employer] IntharLedgerX Information     [employer] gives you secure access to your electronic health record. If you see a primary care provider, you can also  send messages to your care team and make appointments. If you have questions, please call your primary care clinic.  If you do not have a primary care provider, please call [phone] and they will assist you.        Care EveryWhere ID     This is your Care EveryWhere ID. This could be used by other organizations to access your [place] medical records  [id number]        Your Vitals Were     Last Period                   08/07/2017            Blood Pressure from Last 3 Encounters:   08/30/17 135/70   08/25/17 138/76   08/21/17 (!) 145/97    Weight from Last 3 Encounters:   08/30/17 128.4 kg (283 lb)   08/25/17 128.4 kg (283 lb)   08/21/17 129.7 kg (286 lb)              We Performed the Following     APPLY SHORT ARM SPLINT STATIC     HC OT EVAL, LOW COMPLEXITY     SELF CARE MNGMENT TRAINING        Primary Care Provider    None Specified       No primary provider on file.        Equal Access to Services     [facility]: Hadii bryan Gregory, waprettyda jazmyne, qaybta kaalmada tono, dominic mendoza . So [facility] [phone].    ATENCIÓN: Si habla español, tiene a santo disposición servicios gratuitos de asistencia lingüística. Thomas al [phone].    We comply with applicable federal civil rights laws and [place] laws. We do not discriminate on the basis of race, color, national origin, age, disability sex, sexual orientation or gender identity.            Thank you!     Thank you for choosing [facility] [place]  for your care. Our goal is always to provide you with excellent care. Hearing back from our patients is one way we can continue to improve our services. Please take a few minutes to complete the written survey that you may receive in the mail after your visit with us. Thank you!             Your Updated Medication List - Protect others around you: Learn how to safely use, store and throw away your medicines at www.disposemymeds.org.           This list is accurate as of: 8/31/17 10:51 AM.  Always use your most recent med list.                   Brand Name Dispense Instructions for use Diagnosis    albuterol 108 (90 BASE) MCG/ACT Inhaler    PROAIR HFA/PROVENTIL HFA/VENTOLIN HFA    1 Inhaler    Inhale 2 puffs into the lungs every 6 hours as needed for shortness of breath / dyspnea or wheezing    Acute bronchospasm
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
## 2017-09-06 ENCOUNTER — THERAPY VISIT (OUTPATIENT)
Dept: OCCUPATIONAL THERAPY | Facility: CLINIC | Age: 42
End: 2017-09-06
Payer: COMMERCIAL

## 2017-09-06 DIAGNOSIS — M79.644 PAIN OF FINGER OF RIGHT HAND: ICD-10-CM

## 2017-09-06 DIAGNOSIS — S62.392D: ICD-10-CM

## 2017-09-06 DIAGNOSIS — R29.898 MECHANICAL PROBLEMS WITH LIMBS: Primary | ICD-10-CM

## 2017-09-06 DIAGNOSIS — M25.641 FINGER STIFFNESS, RIGHT: ICD-10-CM

## 2017-09-06 PROCEDURE — 97110 THERAPEUTIC EXERCISES: CPT | Mod: GO | Performed by: OCCUPATIONAL THERAPIST

## 2017-09-06 PROCEDURE — 97760 ORTHOTIC MGMT&TRAING 1ST ENC: CPT | Mod: GO | Performed by: OCCUPATIONAL THERAPIST

## 2017-09-06 NOTE — MR AVS SNAPSHOT
After Visit Summary   9/6/2017    Nighat Nicole    MRN: [id number]           Patient Information     Date Of Birth          1975        Visit Information        Provider Department      9/6/2017 8:30 AM Aurelia Whittaker [facility] [place]        Today's Diagnoses     Mechanical problems with limbs    -  1    Pain of finger of right hand        Finger stiffness, right        Oth fx third MC bone, right hand, subs for fx w routn heal           Follow-ups after your visit        Your next 10 appointments already scheduled     Sep 08, 2017  9:00 AM CDT   Return Visit with Twin Baldwin,    [facility] [place] ([facility] [place])    [address]  [place] [address] [phone]              Who to contact     If you have questions or need follow up information about today's clinic visit or your schedule please contact [facility] [place] directly at [phone].  Normal or non-critical lab and imaging results will be communicated to you by [employer], letter or phone within 4 business days after the clinic has received the results. If you do not hear from us within 7 days, please contact the clinic through [employer] or phone. If you have a critical or abnormal lab result, we will notify you by phone as soon as possible.  Submit refill requests through [employer] or call your pharmacy and they will forward the refill request to us. Please allow 3 business days for your refill to be completed.          Additional Information About Your Visit        [employer] | [employer] Information     [employer] gives you secure access to your electronic health record. If you see a primary care provider, you can also send messages to your care team and make appointments. If you have questions, please call your primary care clinic.  If you do not have a primary care provider, please call [phone] and  they will assist you.        Care EveryWhere ID     This is your Care EveryWhere ID. This could be used by other organizations to access your [place] medical records  [id number]        Your Vitals Were     Last Period                   08/07/2017            Blood Pressure from Last 3 Encounters:   08/30/17 135/70   08/25/17 138/76   08/21/17 (!) 145/97    Weight from Last 3 Encounters:   08/30/17 128.4 kg (283 lb)   08/25/17 128.4 kg (283 lb)   08/21/17 129.7 kg (286 lb)              We Performed the Following     ORTHOTIC MGMT AND TRAINING, EACH 15 MIN     THERAPEUTIC EXERCISES        Primary Care Provider    None Specified       No primary provider on file.        Equal Access to Services     YOVANA FRAUSTO : Joaquim Gregory, des samano, michael power, dominic amador. So [facility] [phone].    ATENCIÓN: Si habla español, tiene a santo disposición servicios gratuitos de asistencia lingüística. Llame al [phone].    We comply with applicable federal civil rights laws and [place] laws. We do not discriminate on the basis of race, color, national origin, age, disability sex, sexual orientation or gender identity.            Thank you!     Thank you for choosing [facility] [place]  for your care. Our goal is always to provide you with excellent care. Hearing back from our patients is one way we can continue to improve our services. Please take a few minutes to complete the written survey that you may receive in the mail after your visit with us. Thank you!             Your Updated Medication List - Protect others around you: Learn how to safely use, store and throw away your medicines at www.disposemymeds.org.          This list is accurate as of: 9/6/17 10:37 AM.  Always use your most recent med list.                   Brand Name Dispense Instructions for use Diagnosis    albuterol 108 (90 BASE) MCG/ACT Inhaler    PROAIR  HFA/PROVENTIL HFA/VENTOLIN HFA    1 Inhaler    Inhale 2 puffs into the lungs every 6 hours as needed for shortness of breath / dyspnea or wheezing    Acute bronchospasm

## 2017-09-06 NOTE — PROGRESS NOTES
SOAP note objective information for 9/6/2017:    ROM:  AROM(PROM)  9/6/17    Left Right   Long MP 0/90 -10/70   PIP 0/105 -5/105   DIP 0/80 0/60     Home Exercise Program:  Wear forearm based ulnar gutter intrinsic plus orthosis full time between exercise  Issued triple trappers when able to wean orthosis  Gentle AROM fingers with table top, hook, full and straight finger  Gentle functional AROM wrist    Next Visit:  Await MD f/u 9/7/17  See in 1-2 weeks to reassess ROM and begin gentle  strengthening    Please refer to the daily flowsheet for treatment today, total treatment time and time spent performing 1:1 timed codes.

## 2017-09-08 ENCOUNTER — OFFICE VISIT (OUTPATIENT)
Dept: ORTHOPEDICS | Facility: CLINIC | Age: 42
End: 2017-09-08
Payer: COMMERCIAL

## 2017-09-08 VITALS
WEIGHT: 283 LBS | SYSTOLIC BLOOD PRESSURE: 132 MMHG | DIASTOLIC BLOOD PRESSURE: 76 MMHG | BODY MASS INDEX: 42.89 KG/M2 | HEIGHT: 68 IN

## 2017-09-08 DIAGNOSIS — S62.392D CLOSED NONDISPLACED FRACTURE OF OTHER PART OF THIRD METACARPAL BONE OF RIGHT HAND WITH ROUTINE HEALING, SUBSEQUENT ENCOUNTER: Primary | ICD-10-CM

## 2017-09-08 PROCEDURE — 99213 OFFICE O/P EST LOW 20 MIN: CPT | Performed by: FAMILY MEDICINE
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
## 2017-09-08 NOTE — NURSING NOTE
"Chief Complaint   Patient presents with     Fracture Followup     f/u left hand fracture > 3 weeks       Initial /76  Ht 5' 8\" (1.727 m)  Wt 283 lb (128.4 kg)  LMP 08/07/2017  BMI 43.03 kg/m2 Estimated body mass index is 43.03 kg/(m^2) as calculated from the following:    Height as of this encounter: 5' 8\" (1.727 m).    Weight as of this encounter: 283 lb (128.4 kg).  Medication Reconciliation: complete     Avel Burden, ATC  "

## 2017-09-27 ENCOUNTER — OFFICE VISIT (OUTPATIENT)
Dept: ORTHOPEDICS | Facility: CLINIC | Age: 42
End: 2017-09-27
Payer: COMMERCIAL

## 2017-09-27 VITALS
DIASTOLIC BLOOD PRESSURE: 76 MMHG | SYSTOLIC BLOOD PRESSURE: 126 MMHG | WEIGHT: 283 LBS | BODY MASS INDEX: 42.89 KG/M2 | HEIGHT: 68 IN

## 2017-09-27 DIAGNOSIS — S62.392D CLOSED NONDISPLACED FRACTURE OF OTHER PART OF THIRD METACARPAL BONE OF RIGHT HAND WITH ROUTINE HEALING, SUBSEQUENT ENCOUNTER: Primary | ICD-10-CM

## 2017-09-27 PROCEDURE — 99213 OFFICE O/P EST LOW 20 MIN: CPT | Performed by: FAMILY MEDICINE

## 2017-09-27 NOTE — MR AVS SNAPSHOT
After Visit Summary   9/27/2017    Nighat Nicole    MRN: [id number]           Patient Information     Date Of Birth          1975        Visit Information        Provider Department      9/27/2017 9:00 AM Twin Baldwin DO [facility]        Today's Diagnoses     Closed nondisplaced fracture of other part of third metacarpal bone of right hand with routine healing, subsequent encounter    -  1       Follow-ups after your visit        Your next 10 appointments already scheduled     Oct 05, 2017  3:30 PM CDT   Consult HOD with Anni Bonilla RD   [facility] ([facility])    [address] [address] [phone]              Who to contact     If you have questions or need follow up information about today's clinic visit or your schedule please contact [facility] directly at [phone].  Normal or non-critical lab and imaging results will be communicated to you by MyChart, letter or phone within 4 business days after the clinic has received the results. If you do not hear from us within 7 days, please contact the clinic through [employer] or phone. If you have a critical or abnormal lab result, we will notify you by phone as soon as possible.  Submit refill requests through [employer] or call your pharmacy and they will forward the refill request to us. Please allow 3 business days for your refill to be completed.          Additional Information About Your Visit        MyChart Information     [employer] gives you secure access to your electronic health record. If you see a primary care provider, you can also send messages to your care team and make appointments. If you have questions, please call your primary care clinic.  If you do not have a primary care provider, please call [phone] and they will assist you.        Care EveryWhere ID     This is your Care EveryWhere ID.  "This could be used by other organizations to access your [facility] medical records  [id number]        Your Vitals Were     Height BMI (Body Mass Index)                5' 8\" (1.727 m) 43.03 kg/m2           Blood Pressure from Last 3 Encounters:   09/27/17 126/76   09/08/17 132/76   08/30/17 135/70    Weight from Last 3 Encounters:   09/27/17 283 lb (128.4 kg)   09/08/17 283 lb (128.4 kg)   08/30/17 283 lb (128.4 kg)              Today, you had the following     No orders found for display       Primary Care Provider    None Specified       No primary provider on file.        Equal Access to Services     [facility] : Joaquim Gregory, des samano, michael power, dominic mendoza . So [facility] [phone].    ATENCIÓN: Si habla español, tiene a santo disposición servicios gratuitos de asistencia lingüística. Llame al [phone].    We comply with applicable federal civil rights laws and [place] laws. We do not discriminate on the basis of race, color, national origin, age, disability sex, sexual orientation or gender identity.            Thank you!     Thank you for choosing [facility]  for your care. Our goal is always to provide you with excellent care. Hearing back from our patients is one way we can continue to improve our services. Please take a few minutes to complete the written survey that you may receive in the mail after your visit with us. Thank you!             Your Updated Medication List - Protect others around you: Learn how to safely use, store and throw away your medicines at www.disposemymeds.org.          This list is accurate as of: 9/27/17  9:15 AM.  Always use your most recent med list.                   Brand Name Dispense Instructions for use Diagnosis    albuterol 108 (90 BASE) MCG/ACT Inhaler    PROAIR HFA/PROVENTIL HFA/VENTOLIN HFA    1 Inhaler    Inhale 2 puffs into the lungs every 6 hours as needed for " shortness of breath / dyspnea or wheezing    Acute bronchospasm
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
## 2017-09-27 NOTE — NURSING NOTE
"Chief Complaint   Patient presents with     Fracture Followup     f/u right hand fracture > 4 weeks       Initial /76  Ht 5' 8\" (1.727 m)  Wt 283 lb (128.4 kg)  BMI 43.03 kg/m2 Estimated body mass index is 43.03 kg/(m^2) as calculated from the following:    Height as of this encounter: 5' 8\" (1.727 m).    Weight as of this encounter: 283 lb (128.4 kg).  Medication Reconciliation: complete     Avel Burden, ATC  "

## 2017-10-05 ENCOUNTER — HOSPITAL ENCOUNTER (OUTPATIENT)
Dept: NUTRITION | Facility: CLINIC | Age: 42
Discharge: HOME | End: 2017-10-05
Attending: OBSTETRICS & GYNECOLOGY | Admitting: OBSTETRICS & GYNECOLOGY
Payer: COMMERCIAL

## 2017-10-05 PROCEDURE — 97802 MEDICAL NUTRITION INDIV IN: CPT | Performed by: DIETITIAN, REGISTERED

## 2017-10-06 NOTE — PROGRESS NOTES
[facility]  Medical Nutrition Therapy    Visit Type: Initial Assessment    Nighat Nicole referred by Dr. Guerrero for MNT related to Obesity    Nutrition Assessment:  Anthropometrics  Wt Readings from Last 10 Encounters:   09/27/17 128.4 kg (283 lb)   09/08/17 128.4 kg (283 lb)   08/30/17 128.4 kg (283 lb)   08/25/17 128.4 kg (283 lb)   08/21/17 129.7 kg (286 lb)   06/08/17 127 kg (280 lb)   06/06/17 126.1 kg (278 lb)   05/01/17 125.6 kg (277 lb)   04/03/17 126.9 kg (279 lb 12.8 oz)   01/09/17 126.6 kg (279 lb)     Nutrition History  Typical Food/Fluid Intake: Regular diet, no restrictions  Meal/Snack Patterns: Very consistent  Previous Diet / Nutrition Education / Counseling: None  Self-selected diet(s) followed: None  Dieting Attempts: None  Level of Knowledge: Basic knowledge  Beliefs and Attitudes: Readiness to change nutrition-related behaviors  Willingness to Learn: Acceptance    Patient here with weight concerns. Her doctor recommended that she lower her BMI to help with fertility. Patient gets 7 - 8 hours of sleep each night, but is up often throughout the night. Her day-to-day stress level is a 5 on a 1 - 10 scale (10 being the highest). Her  is on Weight Watchers and has IBS, so he avoids some high-fodmap foods. Patient is very in tune with her hunger/fullness and responds appropriately.    Physical Activity  Physical Activity History: Patient is very active. She exercises 6 days/week. She recently fractured her hand, which has limited her activity some.     Nutrition Prescription  Energy:  2511 kcals/day      Protein:  102 g/day         Fluid:  1 mL/kcal        Food Record  Breakfast: eggs and sometimes hashbrowns; water and coffee to drink   Snack: apple, crackers and cheese, apples with almond butter, or yogurt   Lunch: leftovers such as chicken with potatoes and vegetable   Snack: same as above   Dinner: chicken salad or steak and pork chop with potatoes/vegetables  Snack:  popcorn    Drinks water, sparkling water, and tea throughout the day     Nutrition Diagnosis:  Problem: Food and nutrition-related knowledge deficit  Etiology: No previous exposure to general food- and nutrition-related education  Signs and Symptoms: Reports of no prevous food- and nutrition-related education    Nutrition Intervention:  Meals and Snacks: Modify distribution, type or amount of food and nutrients within meals or at specified time  -Discussed adding diversity into eating patterns. Patient reports that she's been eating a lot of the same things because her  has some restrictions. We discussed how she can add diversity without having to cook several meals.   -Encouraged patient to try to include at least, if not more than, 3 food groups at each meal.   -Discussed some helpful websites/tools for meal planning and recipe ideas.     Nutrition Education: Survival information  -Encouraged patient to continue with current eating patterns in regards to timing.   -Encouraged patient to continue with exercise as able.   -Discussed self-monitoring and encouraged patient to try writing down foods throughout the day.     Nutrition Goals:  -Work on adding diversity to eating patterns  -Track food intake     Nutrition Follow Up / Monitoring:  Meal/snack/beverage composition; Eating patterns     Nutrition Recommendations:  Patient to follow-up with RD in 4 - 6 weeks.  Patient has RD contact information to call/email if needed.    Anni Bonilla RDN, LD  Clinical Dietitian  [phone]    Start time 1535  End time 1615

## 2017-11-08 PROBLEM — R29.898 MECHANICAL PROBLEMS WITH LIMBS: Status: RESOLVED | Noted: 2017-09-06 | Resolved: 2017-11-08

## 2017-11-08 PROBLEM — S62.392D: Status: RESOLVED | Noted: 2017-08-31 | Resolved: 2017-11-08

## 2017-11-08 PROBLEM — M25.641 FINGER STIFFNESS, RIGHT: Status: RESOLVED | Noted: 2017-08-31 | Resolved: 2017-11-08

## 2017-11-08 PROBLEM — M79.644 PAIN OF FINGER OF RIGHT HAND: Status: RESOLVED | Noted: 2017-08-31 | Resolved: 2017-11-08

## 2018-01-29 ENCOUNTER — OFFICE VISIT (OUTPATIENT)
Dept: FAMILY MEDICINE | Facility: CLINIC | Age: 43
End: 2018-01-29
Payer: COMMERCIAL

## 2018-01-29 VITALS
HEIGHT: 68 IN | DIASTOLIC BLOOD PRESSURE: 76 MMHG | SYSTOLIC BLOOD PRESSURE: 138 MMHG | TEMPERATURE: 98.3 F | OXYGEN SATURATION: 99 % | HEART RATE: 84 BPM | WEIGHT: 281 LBS | BODY MASS INDEX: 42.59 KG/M2

## 2018-01-29 DIAGNOSIS — J20.6 ACUTE BRONCHITIS DUE TO RHINOVIRUS: Primary | ICD-10-CM

## 2018-01-29 PROCEDURE — 99213 OFFICE O/P EST LOW 20 MIN: CPT | Performed by: FAMILY MEDICINE

## 2018-01-29 NOTE — MR AVS SNAPSHOT
After Visit Summary   1/29/2018    Nighat Nicole    MRN: [id number]           Patient Information     Date Of Birth          1975        Visit Information        Provider Department      1/29/2018 11:20 AM Angelica Hernandez MD [facility] Instructions    Bronchitis----This looks like a bronchitis -- this is a viral infection the vast majority of the time.    Recommend starting albuterol 4 times daily.  Taper off of the albuterol as needed as your symptoms resolve.    If symptoms persist for more than 7 days, call and we will fax in a prescription for Azithromycin x 5 days.    Return for follow-up if symptoms worsen or fail to improve despite treatment with Azithromycin after 7-10 days.  Continue symptomatic measures, fluids, and rest in the meantime.            Follow-ups after your visit        Who to contact     Normal or non-critical lab and imaging results will be communicated to you by [employer], letter or phone within 4 business days after the clinic has received the results. If you do not hear from us within 7 days, please contact the clinic through [employer] or phone. If you have a critical or abnormal lab result, we will notify you by phone as soon as possible.  Submit refill requests through [employer] or call your pharmacy and they will forward the refill request to us. Please allow 3 business days for your refill to be completed.          If you need to speak with a  for additional information , please call: [phone]             Additional Information About Your Visit        [employer] Information     [employer] gives you secure access to your electronic health record. If you see a primary care provider, you can also send messages to your care team and make appointments. If you have questions, please call your primary care clinic.  If you do not have a primary care provider, please call [phone] and they will assist you.        Care EveryWhere ID   "   This is your Care EveryWhere ID. This could be used by other organizations to access your [facility] medical records  [id number]        Your Vitals Were     Pulse Temperature Height Pulse Oximetry BMI (Body Mass Index)       84 98.3  F (36.8  C) (Tympanic) 5' 8\" (1.727 m) 99% 42.73 kg/m2        Blood Pressure from Last 3 Encounters:   01/29/18 138/76   09/27/17 126/76   09/08/17 132/76    Weight from Last 3 Encounters:   01/29/18 281 lb (127.5 kg)   09/27/17 283 lb (128.4 kg)   09/08/17 283 lb (128.4 kg)              Today, you had the following     No orders found for display       Primary Care Provider Office Phone # Fax #    Gladis Fields PA-C [phone] [address] [address]        Equal Access to Services     JAIRO FRAUSTO : Hadii bryan lewiso Sokenisha, waaxda lulibradoadaha, qaybta kaalmada adeflorenceyada, dominic mendoza . So [facility] [phone].    ATENCIÓN: Si habla español, tiene a santo disposición servicios gratuitos de asistencia lingüística. Llame al [phone].    We comply with applicable federal civil rights laws and [place] laws. We do not discriminate on the basis of race, color, national origin, age, disability, sex, sexual orientation, or gender identity.            Thank you!     Thank you for choosing [facility]  for your care. Our goal is always to provide you with excellent care. Hearing back from our patients is one way we can continue to improve our services. Please take a few minutes to complete the written survey that you may receive in the mail after your visit with us. Thank you!             Your Updated Medication List - Protect others around you: Learn how to safely use, store and throw away your medicines at www.disposemymeds.org.          This list is accurate as of 1/29/18 11:44 AM.  Always use your most recent med list.                   Brand Name Dispense Instructions for use Diagnosis    albuterol " 108 (90 BASE) MCG/ACT Inhaler    PROAIR HFA/PROVENTIL HFA/VENTOLIN HFA    1 Inhaler    Inhale 2 puffs into the lungs every 6 hours as needed for shortness of breath / dyspnea or wheezing    Acute bronchospasm

## 2018-01-29 NOTE — LETTER
[facility]  [id number] [place] [id number]  Phone: [phone]        To Whom It May Concern,    Nighat Nicole was seen in clinic today.  Due to illness, I recommend she not work today.      Sincerely,  Angelica Hernandez MD

## 2018-01-29 NOTE — PROGRESS NOTES
"  SUBJECTIVE:   Nighat Nicole is a 42 year old female who presents to clinic today for the following health issues:      ENT Symptoms             Symptoms: cc Present Absent Comment   Fever/Chills   x    Fatigue  x     Muscle Aches  x     Eye Irritation   x    Sneezing  x     Nasal Hood/Drg  x     Sinus Pressure/Pain   x    Loss of smell   x    Dental pain   x    Sore Throat  x     Swollen Glands  x     Ear Pain/Fullness   x    Cough  x     Wheeze  x     Chest Pain   x    Shortness of breath   x    Rash   x    Other   x      Symptom duration:  Cough just started recently, other symptoms started 1 week ago    Symptom severity:  moderate    Treatments tried:  Otc cold and flu medication, ibuprofen    Contacts:  Possible      Has history of asthma as a child.  Has had some wheezing with allergies in the spring.  Has an albuterol inhaler she has not used for awhile (prescribed 4/17) and tried it last night.  That helped a little but still coughing.  Does not use a spacer.          Problem list and histories reviewed & adjusted, as indicated.  Additional history: as documented      Reviewed and updated as needed this visit by clinical staff       Reviewed and updated as needed this visit by Provider         ROS:  Constitutional, HEENT, cardiovascular, pulmonary, gi and gu systems are negative, except as otherwise noted.    OBJECTIVE:     /76  Pulse 84  Temp 98.3  F (36.8  C) (Tympanic)  Ht 5' 8\" (1.727 m)  Wt 281 lb (127.5 kg)  SpO2 99%  BMI 42.73 kg/m2  Body mass index is 42.73 kg/(m^2).  GENERAL: healthy, alert and no distress  EYES: Eyes grossly normal to inspection, PERRL and conjunctivae and sclerae normal  HENT: ear canals and TM's normal, nose and mouth without ulcers or lesions  NECK: no adenopathy, no asymmetry, masses, or scars and thyroid normal to palpation  RESP: lungs clear to auscultation - no rales, rhonchi.  Few scattered end expiratory wheezes noted with forced exhalation bilaterally.  CV: " regular rate and rhythm, normal S1 S2, no S3 or S4, no murmur, click or rub, no peripheral edema and peripheral pulses strong  ABDOMEN: soft, nontender, no hepatosplenomegaly, no masses and bowel sounds normal  MS: no gross musculoskeletal defects noted, no edema    Diagnostic Test Results:  none     ASSESSMENT/PLAN:         ASSESSMENT/PLAN:    (J20.6) Acute bronchitis due to Rhinovirus  (primary encounter diagnosis)  Comment: mild wheezing but worsening per patient  Plan: use albuterol inhaler qid.  Given spacer to help distribute medication in lungs.        F/U if issues arise    Assessment and plan reviewed. Questions answered  .    See Patient Instructions    Angelica Hernandez MD  [facility]

## 2018-01-29 NOTE — PATIENT INSTRUCTIONS
Bronchitis----This looks like a bronchitis -- this is a viral infection the vast majority of the time.    Recommend starting albuterol 4 times daily.  Taper off of the albuterol as needed as your symptoms resolve.    If symptoms persist for more than 7 days, call and we will fax in a prescription for Azithromycin x 5 days.    Return for follow-up if symptoms worsen or fail to improve despite treatment with Azithromycin after 7-10 days.  Continue symptomatic measures, fluids, and rest in the meantime.

## 2020-03-10 ENCOUNTER — HEALTH MAINTENANCE LETTER (OUTPATIENT)
Age: 45
End: 2020-03-10

## 2020-12-27 ENCOUNTER — HEALTH MAINTENANCE LETTER (OUTPATIENT)
Age: 45
End: 2020-12-27

## 2021-04-24 ENCOUNTER — HEALTH MAINTENANCE LETTER (OUTPATIENT)
Age: 46
End: 2021-04-24

## 2021-10-09 ENCOUNTER — HEALTH MAINTENANCE LETTER (OUTPATIENT)
Age: 46
End: 2021-10-09

## 2022-05-16 ENCOUNTER — HEALTH MAINTENANCE LETTER (OUTPATIENT)
Age: 47
End: 2022-05-16

## 2022-08-05 ENCOUNTER — TRANSFERRED RECORDS (OUTPATIENT)
Dept: FAMILY MEDICINE | Facility: CLINIC | Age: 47
End: 2022-08-05

## 2022-09-11 ENCOUNTER — HEALTH MAINTENANCE LETTER (OUTPATIENT)
Age: 47
End: 2022-09-11

## 2023-01-05 NOTE — MR AVS SNAPSHOT
After Visit Summary   8/30/2017    Nighat Nicole    MRN: [id number]           Patient Information     Date Of Birth          1975        Visit Information        Provider Department      8/30/2017 12:20 PM Twin Baldwin,  [facility] [place]        Today's Diagnoses     Closed nondisplaced fracture of other part of third metacarpal bone of right hand with routine healing, subsequent encounter    -  1       Follow-ups after your visit        Additional Services     [facility] PT, HAND, AND CHIROPRACTIC REFERRAL       **This order will print in the [facility] Scheduling Office**    Physical Therapy, Hand Therapy and Chiropractic Care are available through:    *[facility]  *[facility]  *[facility]    Call one number to schedule at any of the above locations: [phone].    Your provider has referred you to: Hand Therapy    Indication/Reason for Referral: Hand Pain, needs removable, wrist based ulnar gutter splint for fingers 3-5 in intrinsic plus position  Onset of Illness:   5 days  Therapy Orders: see splint instructions above  Special Programs: None  Special Request: see above    Marilu Michel      Additional Comments for the Therapist or Chiropractor: see above for splint type    Please be aware that coverage of these services is subject to the terms and limitations of your health insurance plan.  Call member services at your health plan with any benefit or coverage questions.      Please bring the following to your appointment:    *Your personal calendar for scheduling future appointments  *Comfortable clothing                  Your next 10 appointments already scheduled     Aug 31, 2017  8:00 AM CDT   (Arrive by 7:45 AM)   JENNIFER Hand with Candida French OT   [facility] [place] ([facility] [place] [facility])    [address]  [place] [address]   [phone]          How Severe Is Your Skin Lesion?: mild "   Sep 08, 2017  9:00 AM CDT   Return Visit with Twin Baldwin, DO   [facility] [place] ([facility] [place])    [address]  [place] [address] [phone]              Who to contact     If you have questions or need follow up information about today's clinic visit or your schedule please contact [facility] [place] directly at [phone].  Normal or non-critical lab and imaging results will be communicated to you by navabihart, letter or phone within 4 business days after the clinic has received the results. If you do not hear from us within 7 days, please contact the clinic through GO-SIMt or phone. If you have a critical or abnormal lab result, we will notify you by phone as soon as possible.  Submit refill requests through Nobl[employer] or call your pharmacy and they will forward the refill request to us. Please allow 3 business days for your refill to be completed.          Additional Information About Your Visit        Noblivity Information     [employer] gives you secure access to your electronic health record. If you see a primary care provider, you can also send messages to your care team and make appointments. If you have questions, please call your primary care clinic.  If you do not have a primary care provider, please call [phone] and they will assist you.        Care EveryWhere ID     This is your Care EveryWhere ID. This could be used by other organizations to access your [facility] medical records  [id number]        Your Vitals Were     Height Last Period BMI (Body Mass Index)             5' 8\" (1.727 m) 08/07/2017 43.03 kg/m2          Blood Pressure from Last 3 Encounters:   08/30/17 135/70   08/25/17 138/76   08/21/17 (!) 145/97    Weight from Last 3 Encounters:   08/30/17 283 lb (128.4 kg)   08/25/17 283 lb (128.4 kg)   08/21/17 286 lb (129.7 kg)              We Performed the Following     [facility] PT, HAND, AND " Is This A New Presentation, Or A Follow-Up?: [facility] CHIROPRACTIC REFERRAL        Primary Care Provider    None Specified       No primary provider on file.        Equal Access to Services     YOVANA FRAUSTO : Hadjazz Gregory, des samano, adamsnancy esquivelmadominic augustin. So [place] -[phone].    ATENCIÓN: Si habla español, tiene a santo disposición servicios gratuitos de asistencia lingüística. Llame al [phone].    We comply with applicable federal civil rights laws and [place] laws. We do not discriminate on the basis of race, color, national origin, age, disability sex, sexual orientation or gender identity.            Thank you!     Thank you for choosing [facility]  for your care. Our goal is always to provide you with excellent care. Hearing back from our patients is one way we can continue to improve our services. Please take a few minutes to complete the written survey that you may receive in the mail after your visit with us. Thank you!             Your Updated Medication List - Protect others around you: Learn how to safely use, store and throw away your medicines at www.disposemymeds.org.          This list is accurate as of: 8/30/17  6:26 PM.  Always use your most recent med list.                   Brand Name Dispense Instructions for use Diagnosis    albuterol 108 (90 BASE) MCG/ACT Inhaler    PROAIR HFA/PROVENTIL HFA/VENTOLIN HFA    1 Inhaler    Inhale 2 puffs into the lungs every 6 hours as needed for shortness of breath / dyspnea or wheezing    Acute bronchospasm

## 2023-06-03 ENCOUNTER — HEALTH MAINTENANCE LETTER (OUTPATIENT)
Age: 48
End: 2023-06-03

## 2023-10-07 ENCOUNTER — HEALTH MAINTENANCE LETTER (OUTPATIENT)
Age: 48
End: 2023-10-07

## 2024-10-16 ENCOUNTER — TRANSFERRED RECORDS (OUTPATIENT)
Dept: HEALTH INFORMATION MANAGEMENT | Facility: CLINIC | Age: 49
End: 2024-10-16
Payer: COMMERCIAL